# Patient Record
Sex: FEMALE | Race: WHITE | NOT HISPANIC OR LATINO | Employment: OTHER | URBAN - METROPOLITAN AREA
[De-identification: names, ages, dates, MRNs, and addresses within clinical notes are randomized per-mention and may not be internally consistent; named-entity substitution may affect disease eponyms.]

---

## 2017-03-22 ENCOUNTER — HISTORICAL (OUTPATIENT)
Dept: PREADMISSION TESTING | Facility: HOSPITAL | Age: 22
End: 2017-03-22

## 2017-03-23 ENCOUNTER — HISTORICAL (OUTPATIENT)
Dept: SURGERY | Facility: HOSPITAL | Age: 22
End: 2017-03-23

## 2021-07-28 ENCOUNTER — HISTORICAL (OUTPATIENT)
Dept: ADMINISTRATIVE | Facility: HOSPITAL | Age: 26
End: 2021-07-28

## 2021-07-28 LAB
ABS NEUT (OLG): 4.07 X10(3)/MCL (ref 2.1–9.2)
ALBUMIN SERPL-MCNC: 2.8 GM/DL (ref 3.5–5)
ALBUMIN/GLOB SERPL: 0.8 RATIO (ref 1.1–2)
ALP SERPL-CCNC: 88 UNIT/L (ref 40–150)
ALT SERPL-CCNC: 11 UNIT/L (ref 0–55)
AMYLASE SERPL-CCNC: 56 UNIT/L (ref 25–125)
AST SERPL-CCNC: 15 UNIT/L (ref 5–34)
BASOPHILS # BLD AUTO: 0 X10(3)/MCL (ref 0–0.2)
BASOPHILS NFR BLD AUTO: 0 %
BILIRUB SERPL-MCNC: 0.3 MG/DL
BILIRUBIN DIRECT+TOT PNL SERPL-MCNC: 0.1 MG/DL (ref 0–0.5)
BILIRUBIN DIRECT+TOT PNL SERPL-MCNC: 0.2 MG/DL (ref 0–0.8)
BUN SERPL-MCNC: 7.3 MG/DL (ref 7–18.7)
CALCIUM SERPL-MCNC: 8.7 MG/DL (ref 8.4–10.2)
CHLORIDE SERPL-SCNC: 103 MMOL/L (ref 98–107)
CO2 SERPL-SCNC: 27 MMOL/L (ref 22–29)
CREAT SERPL-MCNC: 0.64 MG/DL (ref 0.55–1.02)
CRP SERPL-MCNC: 2.61 MG/DL
EOSINOPHIL # BLD AUTO: 0.1 X10(3)/MCL (ref 0–0.9)
EOSINOPHIL NFR BLD AUTO: 2 %
ERYTHROCYTE [DISTWIDTH] IN BLOOD BY AUTOMATED COUNT: 17.9 % (ref 11.5–17)
FERRITIN SERPL-MCNC: <1.98 NG/ML (ref 4.63–204)
GLOBULIN SER-MCNC: 3.6 GM/DL (ref 2.4–3.5)
GLUCOSE SERPL-MCNC: 82 MG/DL (ref 74–100)
HBV CORE IGM SERPL QL IA: NONREACTIVE
HBV SURFACE AB SER-ACNC: 0.04 MIU/ML
HBV SURFACE AB SERPL IA-ACNC: NONREACTIVE M[IU]/ML
HBV SURFACE AG SERPL QL IA: NONREACTIVE
HCT VFR BLD AUTO: 29.6 % (ref 37–47)
HGB BLD-MCNC: 7.4 GM/DL (ref 12–16)
HIV 1+2 AB+HIV1 P24 AG SERPL QL IA: NONREACTIVE
IRON SATN MFR SERPL: 3 % (ref 20–50)
IRON SERPL-MCNC: 11 UG/DL (ref 50–170)
LIPASE SERPL-CCNC: 35 U/L
LYMPHOCYTES # BLD AUTO: 1.5 X10(3)/MCL (ref 0.6–4.6)
LYMPHOCYTES NFR BLD AUTO: 24 %
MCH RBC QN AUTO: 16.4 PG (ref 27–31)
MCHC RBC AUTO-ENTMCNC: 25 GM/DL (ref 33–36)
MCV RBC AUTO: 65.6 FL (ref 80–94)
MONOCYTES # BLD AUTO: 0.4 X10(3)/MCL (ref 0.1–1.3)
MONOCYTES NFR BLD AUTO: 7 %
NEUTROPHILS # BLD AUTO: 4.07 X10(3)/MCL (ref 2.1–9.2)
NEUTROPHILS NFR BLD AUTO: 66 %
PLATELET # BLD AUTO: 362 X10(3)/MCL (ref 130–400)
PMV BLD AUTO: 9.1 FL (ref 9.4–12.4)
POTASSIUM SERPL-SCNC: 3.8 MMOL/L (ref 3.5–5.1)
PROT SERPL-MCNC: 6.4 GM/DL (ref 6.4–8.3)
RBC # BLD AUTO: 4.51 X10(6)/MCL (ref 4.2–5.4)
SODIUM SERPL-SCNC: 140 MMOL/L (ref 136–145)
TIBC SERPL-MCNC: 321 UG/DL (ref 70–310)
TIBC SERPL-MCNC: 332 UG/DL (ref 250–450)
TRANSFERRIN SERPL-MCNC: 288 MG/DL (ref 180–382)
WBC # SPEC AUTO: 6.2 X10(3)/MCL (ref 4.5–11.5)

## 2021-12-08 ENCOUNTER — HISTORICAL (OUTPATIENT)
Dept: ADMINISTRATIVE | Facility: HOSPITAL | Age: 26
End: 2021-12-08

## 2021-12-08 LAB
ABS NEUT (OLG): 8.46 X10(3)/MCL (ref 2.1–9.2)
ALBUMIN SERPL-MCNC: 3.2 GM/DL (ref 3.5–5)
ALBUMIN/GLOB SERPL: 1 RATIO (ref 1.1–2)
ALP SERPL-CCNC: 163 UNIT/L (ref 40–150)
ALT SERPL-CCNC: 21 UNIT/L (ref 0–55)
AST SERPL-CCNC: 12 UNIT/L (ref 5–34)
BASOPHILS # BLD AUTO: 0 X10(3)/MCL (ref 0–0.2)
BASOPHILS NFR BLD AUTO: 0 %
BILIRUB SERPL-MCNC: 0.2 MG/DL
BILIRUBIN DIRECT+TOT PNL SERPL-MCNC: 0.1 MG/DL (ref 0–0.5)
BILIRUBIN DIRECT+TOT PNL SERPL-MCNC: 0.1 MG/DL (ref 0–0.8)
BUN SERPL-MCNC: 9.3 MG/DL (ref 7–18.7)
CALCIUM SERPL-MCNC: 8.5 MG/DL (ref 8.7–10.5)
CHLORIDE SERPL-SCNC: 106 MMOL/L (ref 98–107)
CO2 SERPL-SCNC: 28 MMOL/L (ref 22–29)
CREAT SERPL-MCNC: 0.63 MG/DL (ref 0.55–1.02)
CRP SERPL-MCNC: 1.83 MG/DL
EOSINOPHIL # BLD AUTO: 0 X10(3)/MCL (ref 0–0.9)
EOSINOPHIL NFR BLD AUTO: 0 %
ERYTHROCYTE [DISTWIDTH] IN BLOOD BY AUTOMATED COUNT: 14.1 % (ref 11.5–17)
GLOBULIN SER-MCNC: 3.1 GM/DL (ref 2.4–3.5)
GLUCOSE SERPL-MCNC: 103 MG/DL (ref 74–100)
HCT VFR BLD AUTO: 36.1 % (ref 37–47)
HGB BLD-MCNC: 11.8 GM/DL (ref 12–16)
LYMPHOCYTES # BLD AUTO: 0.8 X10(3)/MCL (ref 0.6–4.6)
LYMPHOCYTES NFR BLD AUTO: 8 %
MCH RBC QN AUTO: 26.8 PG (ref 27–31)
MCHC RBC AUTO-ENTMCNC: 32.7 GM/DL (ref 33–36)
MCV RBC AUTO: 82 FL (ref 80–94)
MONOCYTES # BLD AUTO: 0.2 X10(3)/MCL (ref 0.1–1.3)
MONOCYTES NFR BLD AUTO: 2 %
NEUTROPHILS # BLD AUTO: 8.46 X10(3)/MCL (ref 2.1–9.2)
NEUTROPHILS NFR BLD AUTO: 88 %
PLATELET # BLD AUTO: 325 X10(3)/MCL (ref 130–400)
PMV BLD AUTO: 8.7 FL (ref 9.4–12.4)
POTASSIUM SERPL-SCNC: 3.9 MMOL/L (ref 3.5–5.1)
PROT SERPL-MCNC: 6.3 GM/DL (ref 6.4–8.3)
RBC # BLD AUTO: 4.4 X10(6)/MCL (ref 4.2–5.4)
SODIUM SERPL-SCNC: 143 MMOL/L (ref 136–145)
WBC # SPEC AUTO: 9.6 X10(3)/MCL (ref 4.5–11.5)

## 2021-12-09 ENCOUNTER — HISTORICAL (OUTPATIENT)
Dept: ADMINISTRATIVE | Facility: HOSPITAL | Age: 26
End: 2021-12-09

## 2021-12-09 LAB — C DIFF INTERP: NEGATIVE

## 2022-02-14 ENCOUNTER — HISTORICAL (OUTPATIENT)
Dept: ADMINISTRATIVE | Facility: HOSPITAL | Age: 27
End: 2022-02-14

## 2022-02-14 LAB
ABS NEUT (OLG): 4.57 (ref 2.1–9.2)
ALBUMIN SERPL-MCNC: 3.3 G/DL (ref 3.5–5)
ALBUMIN/GLOB SERPL: 1.2 {RATIO} (ref 1.1–2)
ALP SERPL-CCNC: 85 U/L (ref 40–150)
ALT SERPL-CCNC: 18 U/L (ref 0–55)
AST SERPL-CCNC: 17 U/L (ref 5–34)
BASOPHILS # BLD AUTO: 0 10*3/UL (ref 0–0.2)
BASOPHILS NFR BLD AUTO: 0 %
BILIRUB SERPL-MCNC: 0.3 MG/DL
BILIRUBIN DIRECT+TOT PNL SERPL-MCNC: 0.1 (ref 0–0.5)
BILIRUBIN DIRECT+TOT PNL SERPL-MCNC: 0.2 (ref 0–0.8)
BUN SERPL-MCNC: 13.9 MG/DL (ref 7–18.7)
CALCIUM SERPL-MCNC: 9 MG/DL (ref 8.7–10.5)
CHLORIDE SERPL-SCNC: 102 MMOL/L (ref 98–107)
CO2 SERPL-SCNC: 28 MMOL/L (ref 22–29)
CREAT SERPL-MCNC: 0.61 MG/DL (ref 0.55–1.02)
CRP SERPL HS-MCNC: 0.89 MG/L
EOSINOPHIL # BLD AUTO: 0.1 10*3/UL (ref 0–0.9)
EOSINOPHIL NFR BLD AUTO: 2 %
ERYTHROCYTE [DISTWIDTH] IN BLOOD BY AUTOMATED COUNT: 12.7 % (ref 11.5–17)
GLOBULIN SER-MCNC: 2.7 G/DL (ref 2.4–3.5)
GLUCOSE SERPL-MCNC: 89 MG/DL (ref 74–100)
HCT VFR BLD AUTO: 37.9 % (ref 37–47)
HEMOLYSIS INTERF INDEX SERPL-ACNC: <0
HGB BLD-MCNC: 11.8 G/DL (ref 12–16)
ICTERIC INTERF INDEX SERPL-ACNC: 0
LIPEMIC INTERF INDEX SERPL-ACNC: 0
LYMPHOCYTES # BLD AUTO: 1.3 10*3/UL (ref 0.6–4.6)
LYMPHOCYTES NFR BLD AUTO: 20 %
MANUAL DIFF? (OHS): NO
MCH RBC QN AUTO: 25.7 PG (ref 27–31)
MCHC RBC AUTO-ENTMCNC: 31.1 G/DL (ref 33–36)
MCV RBC AUTO: 82.6 FL (ref 80–94)
MONOCYTES # BLD AUTO: 0.4 10*3/UL (ref 0.1–1.3)
MONOCYTES NFR BLD AUTO: 6 %
NEUTROPHILS # BLD AUTO: 4.57 10*3/UL (ref 2.1–9.2)
NEUTROPHILS NFR BLD AUTO: 71 %
PLATELET # BLD AUTO: 360 10*3/UL (ref 130–400)
PMV BLD AUTO: 8.4 FL (ref 9.4–12.4)
POTASSIUM SERPL-SCNC: 4 MMOL/L (ref 3.5–5.1)
PROT SERPL-MCNC: 6 G/DL (ref 6.4–8.3)
RBC # BLD AUTO: 4.59 10*6/UL (ref 4.2–5.4)
SODIUM SERPL-SCNC: 137 MMOL/L (ref 136–145)
WBC # SPEC AUTO: 6.5 10*3/UL (ref 4.5–11.5)

## 2022-04-07 ENCOUNTER — HISTORICAL (OUTPATIENT)
Dept: ADMINISTRATIVE | Facility: HOSPITAL | Age: 27
End: 2022-04-07
Payer: COMMERCIAL

## 2022-04-24 VITALS
WEIGHT: 138 LBS | OXYGEN SATURATION: 98 % | SYSTOLIC BLOOD PRESSURE: 112 MMHG | BODY MASS INDEX: 22.18 KG/M2 | HEIGHT: 66 IN | DIASTOLIC BLOOD PRESSURE: 75 MMHG

## 2022-05-16 ENCOUNTER — OFFICE VISIT (OUTPATIENT)
Dept: HEMATOLOGY/ONCOLOGY | Facility: CLINIC | Age: 27
End: 2022-05-16
Payer: COMMERCIAL

## 2022-05-16 VITALS
TEMPERATURE: 98 F | HEIGHT: 66 IN | RESPIRATION RATE: 20 BRPM | HEART RATE: 72 BPM | DIASTOLIC BLOOD PRESSURE: 71 MMHG | BODY MASS INDEX: 25.51 KG/M2 | SYSTOLIC BLOOD PRESSURE: 111 MMHG | WEIGHT: 158.75 LBS | OXYGEN SATURATION: 99 %

## 2022-05-16 DIAGNOSIS — D50.8 OTHER IRON DEFICIENCY ANEMIA: Primary | ICD-10-CM

## 2022-05-16 DIAGNOSIS — D51.0 PERNICIOUS ANEMIA: ICD-10-CM

## 2022-05-16 PROBLEM — D64.9 ABSOLUTE ANEMIA: Status: ACTIVE | Noted: 2022-05-16

## 2022-05-16 PROCEDURE — 1159F MED LIST DOCD IN RCRD: CPT | Mod: CPTII,,, | Performed by: NURSE PRACTITIONER

## 2022-05-16 PROCEDURE — 3008F BODY MASS INDEX DOCD: CPT | Mod: CPTII,,, | Performed by: NURSE PRACTITIONER

## 2022-05-16 PROCEDURE — 3078F PR MOST RECENT DIASTOLIC BLOOD PRESSURE < 80 MM HG: ICD-10-PCS | Mod: CPTII,,, | Performed by: NURSE PRACTITIONER

## 2022-05-16 PROCEDURE — 99214 PR OFFICE/OUTPT VISIT, EST, LEVL IV, 30-39 MIN: ICD-10-PCS | Mod: ,,, | Performed by: NURSE PRACTITIONER

## 2022-05-16 PROCEDURE — 3008F PR BODY MASS INDEX (BMI) DOCUMENTED: ICD-10-PCS | Mod: CPTII,,, | Performed by: NURSE PRACTITIONER

## 2022-05-16 PROCEDURE — 1160F RVW MEDS BY RX/DR IN RCRD: CPT | Mod: CPTII,,, | Performed by: NURSE PRACTITIONER

## 2022-05-16 PROCEDURE — 1159F PR MEDICATION LIST DOCUMENTED IN MEDICAL RECORD: ICD-10-PCS | Mod: CPTII,,, | Performed by: NURSE PRACTITIONER

## 2022-05-16 PROCEDURE — 3074F PR MOST RECENT SYSTOLIC BLOOD PRESSURE < 130 MM HG: ICD-10-PCS | Mod: CPTII,,, | Performed by: NURSE PRACTITIONER

## 2022-05-16 PROCEDURE — 3074F SYST BP LT 130 MM HG: CPT | Mod: CPTII,,, | Performed by: NURSE PRACTITIONER

## 2022-05-16 PROCEDURE — 3078F DIAST BP <80 MM HG: CPT | Mod: CPTII,,, | Performed by: NURSE PRACTITIONER

## 2022-05-16 PROCEDURE — 99214 OFFICE O/P EST MOD 30 MIN: CPT | Mod: ,,, | Performed by: NURSE PRACTITIONER

## 2022-05-16 PROCEDURE — 1160F PR REVIEW ALL MEDS BY PRESCRIBER/CLIN PHARMACIST DOCUMENTED: ICD-10-PCS | Mod: CPTII,,, | Performed by: NURSE PRACTITIONER

## 2022-05-16 RX ORDER — SYRINGE WITH NEEDLE, 1 ML 28GX1/2"
SYRINGE, EMPTY DISPOSABLE MISCELLANEOUS
COMMUNITY
Start: 2022-03-29

## 2022-05-16 RX ORDER — METHYLPREDNISOLONE SOD SUCC 125 MG
125 VIAL (EA) INJECTION ONCE AS NEEDED
Status: CANCELLED | OUTPATIENT
Start: 2022-05-25

## 2022-05-16 RX ORDER — EPINEPHRINE 0.3 MG/.3ML
0.3 INJECTION SUBCUTANEOUS ONCE AS NEEDED
Status: CANCELLED | OUTPATIENT
Start: 2022-05-25

## 2022-05-16 RX ORDER — PANTOPRAZOLE SODIUM 40 MG/1
40 TABLET, DELAYED RELEASE ORAL DAILY
COMMUNITY
Start: 2022-05-10

## 2022-05-16 RX ORDER — SODIUM CHLORIDE 0.9 % (FLUSH) 0.9 %
10 SYRINGE (ML) INJECTION
Status: CANCELLED | OUTPATIENT
Start: 2022-05-25

## 2022-05-16 RX ORDER — DIPHENHYDRAMINE HYDROCHLORIDE 50 MG/ML
50 INJECTION INTRAMUSCULAR; INTRAVENOUS ONCE AS NEEDED
Status: CANCELLED | OUTPATIENT
Start: 2022-05-25

## 2022-05-16 RX ORDER — HEPARIN 100 UNIT/ML
500 SYRINGE INTRAVENOUS
Status: CANCELLED | OUTPATIENT
Start: 2022-05-25

## 2022-05-16 RX ORDER — AZELASTINE 1 MG/ML
SPRAY, METERED NASAL
COMMUNITY
Start: 2022-03-28 | End: 2022-08-29 | Stop reason: SDDI

## 2022-05-16 NOTE — PROGRESS NOTES
[facility] at [facility]    PATIENT: Josselyn Romero  MRN: [id number]  DATE: 5/16/2022    Diagnosis:  Iron deficiency anemia, Chron's disease.     Chief Complaint: Results (No complaints)    Hematology History:   Josselyn is a 27 yo who was diagnosed with Crohn's 5 years ago. Her symptoms have been much worse in the past year. She has been tried on busedonide and imuran, which were not helpful. She is now on Humira, and her dose is being increased from 40 mg to 80 mg. She notes blood in her stool most every bowel movement. She feels tired and achy, and she has a pica for ice. She is anemic with low iron studies.    Subjective:     Interval History:  5/16/2022:  Ms. Romero presents to clinic today for a 2 month follow up visit.   She reports she took her iron tablet for only 4-5 days. Felt queasy when she would take it. She tried taking it with OJ.   States she is feeling good. Don't get SOB or tired like she did last year.   Continues to crave ice, unchanged from before.   Reports LNMP as 4/28/22. Spouse has had a vasectomy.   Receives monthly B-12 injections for history of pernicious anemia. Last injection was on 4/27/2022.     03/28/2022: Patient presents to clinic today for a follow up visit. She no-showed her last appointment on 3/7/2022.  Takes a Flinstone multivitamin w/ iron. Forgets to take MVI quite often. Last dose was this past Friday. Prior to that maybe a week or so.  Energy level good, much better than in the past.  Pica(craving ice chips) has returned. Started about 3 weeks ago.  Denies any SOB w/ exertion. No dizziness or heart palpitations.  LNMP was one month ago, she is due. Denies any chance of pregnancy. Spouse has had a vasectomy.     1/10/22 Josselyn returns for follow up of JUVENAL and B-12 deficiency related to her Crohn's disease. She is awaiting insurance approval to increase frequency of Entyvio. She is feeling very well, with good energy level. No pica. She had COVID in November with head  "cold symptoms. She had slacked off of her oral iron because she sometimes forgot to take it.    11/15/21 [place] returns for JUVENAL and pernicious anemia secondary to Crohn's disease. she is doing well and her activity level has increased. She is now on q 8 week Entyvio infusions. She denies sob, fatigue, weakness, pica, epistaxis, hematochezia, or melena. She continues to take her chewable vitamin with iron BID, however, she is taking it with food.    Past Medical History: Anxiety, Chron's disease, Gallbladder anomaly, GERD, Panic attack, seasonal allergies.     Past Surgical HIstory: Cholecystectomy(laparoscopic) 03/23/2017, resection of gallbladder, percutaneous endoscopic approach(3/23/2017)  Anesthesia for endoscope, LEEP, Colonoscopy, tonsil.     Family History: Scoliosis - mother and brother.     Social History:  reports that she has quit smoking. Her smoking use included vaping with nicotine. She has never used smokeless tobacco.    Allergies:  Review of patient's allergies indicates:   Allergen Reactions    Budesonide      Other reaction(s): Stomach ache    Codeine      Other reaction(s): unknown    Azathioprine Nausea And Vomiting       Review of Systems:  A complete 12 point ROS done with pertinent positives as described in interval history.  Remainder of ROS done in full and are negative.      ECOG Performance Status: 0   Objective:      Vitals:   Vitals:    05/16/22 1042   BP: 111/71   BP Location: Right arm   Patient Position: Sitting   BP Method: Medium (Automatic)   Pulse: 72   Resp: 20   Temp: 98.1 °F (36.7 °C)   TempSrc: Oral   SpO2: 99%   Weight: 72 kg (158 lb 11.7 oz)   Height: 5' 6.24" (1.682 m)     BMI: Body mass index is 25.43 kg/m².     Physical Exam:   General: Neatly groomed. Well-developed well-nourished in no acute distress  Eye: PERRL, EOMI, pale conjunctiva, sclera anicteric.  HENT: Oral cavity and oropharynx moist. No petechiae or paleness.  Neck: supple, no thyromegaly or cervical or " supraclavicular region lymphadenopathy  Respiratory: clear to auscultation bilaterally. No labored breathing. 02 sats 99% on RA  Cardiovascular: regular rate and rhythm without murmurs, gallops or rubs  Gastrointestinal: soft, non-tender, non-distended with normal bowel sounds, without masses to palpation  Musculoskeletal: good ROM range of motion of all extremities.  Integumentary: no rashes or skin lesions present. No bruising to exposed skin.  Neurologic: cranial nerves intact, no signs of peripheral neurological deficit, motor/sensory function intact .    Laboratory results:  Lab Results   Component Value Date    WBC 6.5 02/14/2022    RBC 4.59 02/14/2022    HGB 11.8 02/14/2022    HCT 37.9 02/14/2022    MCV 82.6 02/14/2022    MCH 25.7 02/14/2022    MCHC 31.1 02/14/2022    RDW 12.7 02/14/2022     02/14/2022    MPV 8.4 02/14/2022        CMP  Lab Results   Component Value Date     02/14/2022    K 4.0 02/14/2022    CO2 28 02/14/2022    BUN 13.9 02/14/2022    CREATININE 0.61 02/14/2022    CALCIUM 9.0 02/14/2022    ALBUMIN 3.3 02/14/2022    BILITOT 0.3 02/14/2022    ALKPHOS 85 02/14/2022    AST 17 02/14/2022    ALT 18 02/14/2022    EGFRNONAA >60 02/14/2022 05/16/2022 - WBC 7.19, HGB 10.9, MCV 75.3, iron 17, ferritin 7, sat 4, TIBC 397, unremarkable CMP,    3/24/22 - B12 - 416      Imaging: None.     Assessment:      1. Iron deficiency anemia D50.9  Patient intolerant to oral iron. Iron levels depleted.   Will have her stop oral iron.   Recommend treatment with IV iron.  Patient is in agreement.     --Chron's disease. On Entyvio. States symptoms are much better.    seeing gastroenterologist every 2 months.    2. Pernicious anemia D51.0       Normal B-12 level.       Will continue once monthly B-12 injections w/ next injection due 5/25/22.      3. Hypokalemia. Resolved.         Plan:     Will get PA for IV iron(Feraheme) x 2 doses. Patient has had before and tolerated well.   Stay off oral iron,  intolerant.   Continue with monthly B12 injections. Next due 5/5/2022.     RTC in 10 weeks for reevaluation. CBC, CMP, iron and Ferritin to be done prior.   Call in the interim for any concerns or questions.         Blessing Floyd, NP-C

## 2022-05-24 DIAGNOSIS — D51.0 PERNICIOUS ANEMIA: ICD-10-CM

## 2022-05-24 RX ORDER — CYANOCOBALAMIN 1000 UG/ML
1000 INJECTION, SOLUTION INTRAMUSCULAR; SUBCUTANEOUS
OUTPATIENT
Start: 2022-05-30

## 2022-05-24 RX ORDER — EPINEPHRINE 0.3 MG/.3ML
0.3 INJECTION SUBCUTANEOUS ONCE AS NEEDED
Status: CANCELLED | OUTPATIENT
Start: 2022-09-05

## 2022-05-24 RX ORDER — METHYLPREDNISOLONE SOD SUCC 125 MG
125 VIAL (EA) INJECTION ONCE AS NEEDED
Status: CANCELLED | OUTPATIENT
Start: 2022-09-05

## 2022-05-24 RX ORDER — SODIUM CHLORIDE 0.9 % (FLUSH) 0.9 %
10 SYRINGE (ML) INJECTION
Status: CANCELLED | OUTPATIENT
Start: 2022-09-05

## 2022-05-24 RX ORDER — DIPHENHYDRAMINE HYDROCHLORIDE 50 MG/ML
50 INJECTION INTRAMUSCULAR; INTRAVENOUS ONCE AS NEEDED
Status: CANCELLED | OUTPATIENT
Start: 2022-09-05

## 2022-05-24 RX ORDER — HEPARIN 100 UNIT/ML
500 SYRINGE INTRAVENOUS
Status: CANCELLED | OUTPATIENT
Start: 2022-09-05

## 2022-08-29 ENCOUNTER — OFFICE VISIT (OUTPATIENT)
Dept: HEMATOLOGY/ONCOLOGY | Facility: CLINIC | Age: 27
End: 2022-08-29
Payer: COMMERCIAL

## 2022-08-29 VITALS
TEMPERATURE: 99 F | HEART RATE: 82 BPM | WEIGHT: 163.13 LBS | RESPIRATION RATE: 20 BRPM | DIASTOLIC BLOOD PRESSURE: 69 MMHG | SYSTOLIC BLOOD PRESSURE: 114 MMHG | HEIGHT: 66 IN | OXYGEN SATURATION: 97 % | BODY MASS INDEX: 26.22 KG/M2

## 2022-08-29 DIAGNOSIS — D50.8 OTHER IRON DEFICIENCY ANEMIA: Primary | ICD-10-CM

## 2022-08-29 PROCEDURE — 1159F PR MEDICATION LIST DOCUMENTED IN MEDICAL RECORD: ICD-10-PCS | Mod: CPTII,,, | Performed by: NURSE PRACTITIONER

## 2022-08-29 PROCEDURE — 3078F PR MOST RECENT DIASTOLIC BLOOD PRESSURE < 80 MM HG: ICD-10-PCS | Mod: CPTII,,, | Performed by: NURSE PRACTITIONER

## 2022-08-29 PROCEDURE — 3074F PR MOST RECENT SYSTOLIC BLOOD PRESSURE < 130 MM HG: ICD-10-PCS | Mod: CPTII,,, | Performed by: NURSE PRACTITIONER

## 2022-08-29 PROCEDURE — 3008F PR BODY MASS INDEX (BMI) DOCUMENTED: ICD-10-PCS | Mod: CPTII,,, | Performed by: NURSE PRACTITIONER

## 2022-08-29 PROCEDURE — 99214 PR OFFICE/OUTPT VISIT, EST, LEVL IV, 30-39 MIN: ICD-10-PCS | Mod: ,,, | Performed by: NURSE PRACTITIONER

## 2022-08-29 PROCEDURE — 1159F MED LIST DOCD IN RCRD: CPT | Mod: CPTII,,, | Performed by: NURSE PRACTITIONER

## 2022-08-29 PROCEDURE — 3078F DIAST BP <80 MM HG: CPT | Mod: CPTII,,, | Performed by: NURSE PRACTITIONER

## 2022-08-29 PROCEDURE — 3074F SYST BP LT 130 MM HG: CPT | Mod: CPTII,,, | Performed by: NURSE PRACTITIONER

## 2022-08-29 PROCEDURE — 3008F BODY MASS INDEX DOCD: CPT | Mod: CPTII,,, | Performed by: NURSE PRACTITIONER

## 2022-08-29 PROCEDURE — 99214 OFFICE O/P EST MOD 30 MIN: CPT | Mod: ,,, | Performed by: NURSE PRACTITIONER

## 2022-08-29 RX ORDER — CETIRIZINE HYDROCHLORIDE 10 MG/1
10 TABLET ORAL
COMMUNITY
Start: 2021-08-05

## 2022-09-01 RX ORDER — DIPHENHYDRAMINE HYDROCHLORIDE 50 MG/ML
50 INJECTION INTRAMUSCULAR; INTRAVENOUS ONCE AS NEEDED
Status: CANCELLED | OUTPATIENT
Start: 2022-09-05

## 2022-09-01 RX ORDER — SODIUM CHLORIDE 0.9 % (FLUSH) 0.9 %
10 SYRINGE (ML) INJECTION
Status: CANCELLED | OUTPATIENT
Start: 2022-09-06

## 2022-09-01 RX ORDER — HEPARIN 100 UNIT/ML
500 SYRINGE INTRAVENOUS
Status: CANCELLED | OUTPATIENT
Start: 2022-09-06

## 2022-09-01 RX ORDER — METHYLPREDNISOLONE SOD SUCC 125 MG
125 VIAL (EA) INJECTION ONCE AS NEEDED
Status: CANCELLED | OUTPATIENT
Start: 2022-09-05

## 2022-09-01 RX ORDER — SODIUM CHLORIDE 0.9 % (FLUSH) 0.9 %
10 SYRINGE (ML) INJECTION
Status: CANCELLED | OUTPATIENT
Start: 2022-09-05

## 2022-09-01 RX ORDER — HEPARIN 100 UNIT/ML
500 SYRINGE INTRAVENOUS
Status: CANCELLED | OUTPATIENT
Start: 2022-09-05

## 2022-09-01 RX ORDER — EPINEPHRINE 0.3 MG/.3ML
0.3 INJECTION SUBCUTANEOUS ONCE AS NEEDED
Status: CANCELLED | OUTPATIENT
Start: 2022-09-05

## 2022-09-15 ENCOUNTER — HOSPITAL ENCOUNTER (EMERGENCY)
Facility: HOSPITAL | Age: 27
Discharge: HOME | End: 2022-09-15
Attending: GENERAL PRACTICE
Payer: COMMERCIAL

## 2022-09-15 ENCOUNTER — TELEPHONE (OUTPATIENT)
Dept: GASTROENTEROLOGY | Facility: CLINIC | Age: 27
End: 2022-09-15
Payer: COMMERCIAL

## 2022-09-15 VITALS
OXYGEN SATURATION: 100 % | HEIGHT: 66 IN | TEMPERATURE: 98 F | SYSTOLIC BLOOD PRESSURE: 114 MMHG | DIASTOLIC BLOOD PRESSURE: 75 MMHG | BODY MASS INDEX: 25.71 KG/M2 | RESPIRATION RATE: 16 BRPM | WEIGHT: 160 LBS | HEART RATE: 64 BPM

## 2022-09-15 DIAGNOSIS — K29.70 GASTRITIS, PRESENCE OF BLEEDING UNSPECIFIED, UNSPECIFIED CHRONICITY, UNSPECIFIED GASTRITIS TYPE: Primary | ICD-10-CM

## 2022-09-15 DIAGNOSIS — K50.00 CROHN'S DISEASE INVOLVING TERMINAL ILEUM: ICD-10-CM

## 2022-09-15 LAB
ALBUMIN SERPL-MCNC: 3.7 GM/DL (ref 3.5–5)
ALBUMIN/GLOB SERPL: 1.1 RATIO (ref 1.1–2)
ALP SERPL-CCNC: 63 UNIT/L (ref 40–150)
ALT SERPL-CCNC: 14 UNIT/L (ref 0–55)
AMYLASE SERPL-CCNC: 74 UNIT/L (ref 25–125)
APPEARANCE UR: CLEAR
AST SERPL-CCNC: 13 UNIT/L (ref 5–34)
B-HCG SERPL QL: NEGATIVE
BACTERIA #/AREA URNS AUTO: ABNORMAL /HPF
BASOPHILS # BLD AUTO: 0.02 X10(3)/MCL (ref 0–0.2)
BASOPHILS NFR BLD AUTO: 0.3 %
BILIRUB UR QL STRIP.AUTO: NEGATIVE MG/DL
BILIRUBIN DIRECT+TOT PNL SERPL-MCNC: 0.3 MG/DL
BUN SERPL-MCNC: 11 MG/DL (ref 7–18.7)
CALCIUM SERPL-MCNC: 9.3 MG/DL (ref 8.4–10.2)
CHLORIDE SERPL-SCNC: 108 MMOL/L (ref 98–107)
CO2 SERPL-SCNC: 24 MMOL/L (ref 22–29)
COLOR UR AUTO: YELLOW
CREAT SERPL-MCNC: 0.75 MG/DL (ref 0.55–1.02)
EOSINOPHIL # BLD AUTO: 0.15 X10(3)/MCL (ref 0–0.9)
EOSINOPHIL NFR BLD AUTO: 2.2 %
ERYTHROCYTE [DISTWIDTH] IN BLOOD BY AUTOMATED COUNT: 12.8 % (ref 11.5–17)
GFR SERPLBLD CREATININE-BSD FMLA CKD-EPI: >60 MLS/MIN/1.73/M2
GLOBULIN SER-MCNC: 3.3 GM/DL (ref 2.4–3.5)
GLUCOSE SERPL-MCNC: 105 MG/DL (ref 74–100)
GLUCOSE UR QL STRIP.AUTO: NEGATIVE MG/DL
HCT VFR BLD AUTO: 41.5 % (ref 37–47)
HGB BLD-MCNC: 13.8 GM/DL (ref 12–16)
IMM GRANULOCYTES # BLD AUTO: 0.02 X10(3)/MCL (ref 0–0.04)
IMM GRANULOCYTES NFR BLD AUTO: 0.3 %
KETONES UR QL STRIP.AUTO: NEGATIVE MG/DL
LEUKOCYTE ESTERASE UR QL STRIP.AUTO: ABNORMAL UNIT/L
LIPASE SERPL-CCNC: 50 U/L
LYMPHOCYTES # BLD AUTO: 1.78 X10(3)/MCL (ref 0.6–4.6)
LYMPHOCYTES NFR BLD AUTO: 26.6 %
MCH RBC QN AUTO: 27.9 PG (ref 27–31)
MCHC RBC AUTO-ENTMCNC: 33.3 MG/DL (ref 33–36)
MCV RBC AUTO: 84 FL (ref 80–94)
MONOCYTES # BLD AUTO: 0.46 X10(3)/MCL (ref 0.1–1.3)
MONOCYTES NFR BLD AUTO: 6.9 %
NEUTROPHILS # BLD AUTO: 4.3 X10(3)/MCL (ref 2.1–9.2)
NEUTROPHILS NFR BLD AUTO: 63.7 %
NITRITE UR QL STRIP.AUTO: NEGATIVE
NRBC BLD AUTO-RTO: 0 %
PH UR STRIP.AUTO: 6.5 [PH]
PLATELET # BLD AUTO: 298 X10(3)/MCL (ref 130–400)
PMV BLD AUTO: 8.9 FL (ref 7.4–10.4)
POTASSIUM SERPL-SCNC: 3.5 MMOL/L (ref 3.5–5.1)
PROT SERPL-MCNC: 7 GM/DL (ref 6.4–8.3)
PROT UR QL STRIP.AUTO: NEGATIVE MG/DL
RBC # BLD AUTO: 4.94 X10(6)/MCL (ref 4.2–5.4)
RBC #/AREA URNS AUTO: ABNORMAL /HPF
RBC UR QL AUTO: NEGATIVE UNIT/L
SODIUM SERPL-SCNC: 141 MMOL/L (ref 136–145)
SP GR UR STRIP.AUTO: 1.02
SQUAMOUS #/AREA URNS AUTO: ABNORMAL /HPF
UROBILINOGEN UR STRIP-ACNC: 0.2 MG/DL
WBC # SPEC AUTO: 6.7 X10(3)/MCL (ref 4.5–11.5)
WBC #/AREA URNS AUTO: ABNORMAL /HPF

## 2022-09-15 PROCEDURE — 96361 HYDRATE IV INFUSION ADD-ON: CPT

## 2022-09-15 PROCEDURE — 63600175 PHARM REV CODE 636 W HCPCS: Performed by: GENERAL PRACTICE

## 2022-09-15 PROCEDURE — 81001 URINALYSIS AUTO W/SCOPE: CPT | Performed by: GENERAL PRACTICE

## 2022-09-15 PROCEDURE — 96374 THER/PROPH/DIAG INJ IV PUSH: CPT

## 2022-09-15 PROCEDURE — 36415 COLL VENOUS BLD VENIPUNCTURE: CPT | Performed by: GENERAL PRACTICE

## 2022-09-15 PROCEDURE — 25000003 PHARM REV CODE 250: Performed by: GENERAL PRACTICE

## 2022-09-15 PROCEDURE — 81025 URINE PREGNANCY TEST: CPT | Performed by: GENERAL PRACTICE

## 2022-09-15 PROCEDURE — 99285 EMERGENCY DEPT VISIT HI MDM: CPT | Mod: 25

## 2022-09-15 PROCEDURE — 82150 ASSAY OF AMYLASE: CPT | Performed by: GENERAL PRACTICE

## 2022-09-15 PROCEDURE — 83690 ASSAY OF LIPASE: CPT | Performed by: GENERAL PRACTICE

## 2022-09-15 PROCEDURE — 85025 COMPLETE CBC W/AUTO DIFF WBC: CPT | Performed by: GENERAL PRACTICE

## 2022-09-15 PROCEDURE — 80053 COMPREHEN METABOLIC PANEL: CPT | Performed by: GENERAL PRACTICE

## 2022-09-15 RX ORDER — SUCRALFATE 1 G/10ML
1 SUSPENSION ORAL
Status: DISCONTINUED | OUTPATIENT
Start: 2022-09-15 | End: 2022-09-15

## 2022-09-15 RX ORDER — MORPHINE SULFATE 2 MG/ML
2 INJECTION, SOLUTION INTRAMUSCULAR; INTRAVENOUS
Status: COMPLETED | OUTPATIENT
Start: 2022-09-15 | End: 2022-09-15

## 2022-09-15 RX ORDER — TRAMADOL HYDROCHLORIDE 50 MG/1
50 TABLET ORAL EVERY 6 HOURS PRN
Qty: 12 TABLET | Refills: 0 | Status: ON HOLD | OUTPATIENT
Start: 2022-09-15 | End: 2023-09-01 | Stop reason: HOSPADM

## 2022-09-15 RX ORDER — SUCRALFATE 1 G/1
1 TABLET ORAL 4 TIMES DAILY
Qty: 120 TABLET | Refills: 0 | Status: SHIPPED | OUTPATIENT
Start: 2022-09-15 | End: 2022-10-15

## 2022-09-15 RX ORDER — SUCRALFATE 1 G/1
1 TABLET ORAL ONCE
Status: COMPLETED | OUTPATIENT
Start: 2022-09-15 | End: 2022-09-15

## 2022-09-15 RX ADMIN — MORPHINE SULFATE 2 MG: 2 INJECTION, SOLUTION INTRAMUSCULAR; INTRAVENOUS at 05:09

## 2022-09-15 RX ADMIN — SODIUM CHLORIDE 500 ML: 9 INJECTION, SOLUTION INTRAVENOUS at 05:09

## 2022-09-15 RX ADMIN — SUCRALFATE 1 G: 1 TABLET ORAL at 05:09

## 2022-09-15 NOTE — ED NOTES
Pt to ed with cc of dark tarry stools after iron infusion. Reports abd discomfort near umbilicus. Pt has hx of chron's. Denies vomiting or nausea at this time. Pt had GI cocktail at urgent care which sts that helped her pain.

## 2022-09-15 NOTE — ED PROVIDER NOTES
Encounter Date: 9/15/2022       History     Chief Complaint   Patient presents with    Abdominal Pain     Lower abdominal pain x 3 hours black tarry stools (possibly from iron infusion)     Lower abdominal pain x 3 hours black tarry stools (possibly from iron infusion)    The history is provided by the patient.   Abdominal Pain  The current episode started just prior to arrival. The onset of the illness was abrupt. The abdominal pain is located in the RLQ and LLQ. The abdominal pain does not radiate. The severity of the abdominal pain is 4/10.   The patient states that she believes she is currently not pregnant. The patient has not had a change in bowel habit.   Review of patient's allergies indicates:   Allergen Reactions    Budesonide      Other reaction(s): Stomach ache    Codeine      Other reaction(s): unknown    Azathioprine Nausea And Vomiting     Past Medical History:   Diagnosis Date    Crohn's colitis      Past Surgical History:   Procedure Laterality Date    CHOLECYSTECTOMY      TONSILLECTOMY       Family History   Problem Relation Age of Onset    Scoliosis Mother     Heart disease Mother     Scoliosis Brother     Breast cancer Maternal Grandmother     Diabetes Paternal Grandmother      Social History     Tobacco Use    Smoking status: Former     Types: Vaping with nicotine    Smokeless tobacco: Never   Substance Use Topics    Alcohol use: Not Currently    Drug use: Yes     Types: Marijuana     Comment: medical marijuana     Review of Systems   Constitutional: Negative.    HENT: Negative.     Eyes: Negative.    Respiratory: Negative.     Cardiovascular: Negative.    Gastrointestinal:  Positive for abdominal pain and blood in stool.   Endocrine: Negative.    Genitourinary: Negative.    Musculoskeletal: Negative.    Skin: Negative.    Allergic/Immunologic: Negative.    Neurological: Negative.    Hematological: Negative.    Psychiatric/Behavioral: Negative.     All other systems reviewed and are  negative.    Physical Exam     Initial Vitals [09/15/22 1559]   BP Pulse Resp Temp SpO2   134/88 76 18 98.4 °F (36.9 °C) 99 %      MAP       --         Physical Exam    Nursing note and vitals reviewed.  Constitutional: She appears well-developed and well-nourished.   Cardiovascular:  Normal rate, regular rhythm, normal heart sounds and intact distal pulses.                 ED Course   Procedures  Labs Reviewed   URINALYSIS, REFLEX TO URINE CULTURE - Abnormal; Notable for the following components:       Result Value    Leukocyte Esterase, UA Trace (*)     All other components within normal limits   COMPREHENSIVE METABOLIC PANEL - Abnormal; Notable for the following components:    Chloride 108 (*)     Glucose Level 105 (*)     All other components within normal limits   URINALYSIS, MICROSCOPIC - Abnormal; Notable for the following components:    Bacteria, UA Few (*)     Squamous Epithelial Cells, UA Few (*)     All other components within normal limits   PREGNANCY TEST, URINE RAPID - Normal   LIPASE - Normal   AMYLASE - Normal   CBC W/ AUTO DIFFERENTIAL    Narrative:     The following orders were created for panel order CBC auto differential.  Procedure                               Abnormality         Status                     ---------                               -----------         ------                     CBC with Differential[[id number]]                            Final result                 Please view results for these tests on the individual orders.   CBC WITH DIFFERENTIAL          Imaging Results              CT Abdomen Pelvis  Without Contrast (Final result)  Result time 09/15/22 16:55:52      Final result by Watson Gonzalez MD (09/15/22 16:55:52)                   Impression:      1. Distal ileum concentric mural thickening reflects with nonspecific ileitis which may be inflammatory or infectious.  Please correlate clinically.    2. Small fluid within the endometrium and right ovarian  cysts..      Electronically signed by: Watson Sandhuahim  Date:    09/15/2022  Time:    16:55               Narrative:    EXAMINATION:  CT ABDOMEN PELVIS WITHOUT CONTRAST    CLINICAL HISTORY:  Abdominal pain, acute, nonlocalized;    TECHNIQUE:  Multidetector axial images were obtained from the  diaphragms to below symphysis pubis without the administration of IV contrast. Oral contrast was not administered.    Dose length product of 376 mGycm. Automated exposure control was utilized to minimize radiation dose.    COMPARISON:  None available    FINDINGS:  Included lungs are without suspicious nodularity, acute air space infiltrates or fluid within the pleural spaces.    Within limitations of noncontrast technique, no acute findings of the liver, pancreas and spleen identified. Gallbladder wall is not thickened and there is no intraluminal calcified calculus. No apparent biliary dilation.    The adrenal glands noncontrast evaluation is unremarkable. The kidneys are unremarkable in size and contour. There is no hydronephrosis or nephrolithiasis. The ureters appear normal in course and diameter without intra ureteral stone.    The stomach is mostly decompressed.  There is concentric mural thickening of the distal ileum which involves approximate length of 11 cm seen on image 69 series 2.  This results in luminal narrowing.  No surrounding inflammatory changes.  There is some submucosal fat infiltration about the ileocecal valve.  Appendix is not visualized.  Please correlate clinically.  Colon is nondistended and there is no pericolonic acute strandings.   No free fluid.    Urinary bladder is partially decompressed.  No intravesical stone identified.  There is fluid within the endometrium.  Right ovarian suspected cysts.   There is no pelvic free fluid.                                       Medications   morphine injection 2 mg (has no administration in time range)   sodium chloride 0.9% bolus 500 mL (has no administration  in time range)     Medical Decision Making:   Clinical Tests:   Lab Tests: Ordered and Reviewed  Radiological Study: Ordered and Reviewed  ED Management:  Laboratory workup and CT scan are negative for any acute process. LE on urinalysis, but patient is asymptomatic. She has Crohn's disease currently on entyvio infusions. History of ileitis in the past and he findings on CT are likely chronic. The pain is more supraumbilical.  We will send home with ultram and carafate. The patient will follow up with her gastroenterologist Dr. Murphy. She may benefit from an EGD to assess for PUD vs gastritis.All this was discussed with the patient and she is on board with the plan.  She has been instructed to return for any worsening of condition.                        Clinical Impression:   Final diagnoses:  [K29.70] Gastritis, presence of bleeding unspecified, unspecified chronicity, unspecified gastritis type (Primary)  [K50.00] Crohn's disease involving terminal ileum      ED Disposition Condition    Discharge Stable          ED Prescriptions       Medication Sig Dispense Start Date End Date Auth. Provider    sucralfate (CARAFATE) 1 gram tablet Take 1 tablet (1 g total) by mouth 4 (four) times daily. 120 tablet 9/15/2022 10/15/2022 Suman Downing MD    traMADoL (ULTRAM) 50 mg tablet Take 1 tablet (50 mg total) by mouth every 6 (six) hours as needed for Pain. 12 tablet 9/15/2022 -- Suman Downing MD          Follow-up Information       Follow up With Specialties Details Why Contact Info    Diogenes Salazar MD Family Medicine Call in 3 days As needed, If symptoms worsen [address] [id number] [phone]               Suman Downing MD  09/15/22 4805

## 2022-09-15 NOTE — TELEPHONE ENCOUNTER
----- Message from Weston Melo MA sent at 9/15/2022  4:08 PM CDT -----  Contact: @[phone] @[phone]    ----- Message -----  From: Dimitri Martinez  Sent: 9/15/2022   4:07 PM CDT  To: Dani Garcia Staff    Caller mom ( Angie) is calling in to see if she can get her daughter an appt, she is in the emergency room. Pt suffers from severe Crohns and needs help. Please call to discuss further.

## 2022-11-22 NOTE — PROGRESS NOTES
[facility] at     PATIENT: Josselyn Romero  MRN: [id number]  DATE: 11/23/22    Diagnosis:  Iron deficiency anemia, Chron's disease.     Chief Complaint: None.     TREATMENT HISTORY: IV ferraheme:  9/13/21 and 9/20/21  10/17/21  5/23/22 and 6/2/22 9/8/22 and 9/15/22  B12 injections monthly ; sometimes no shows  Hematology History:   Josselyn is a 28 yo who was diagnosed with Crohn's 5 years ago. Her symptoms have been much worse in the past year. She has been tried on busedonide and imuran, which were not helpful. She is now on Humira, and her dose is being increased from 40 mg to 80 mg. She notes blood in her stool most every bowel movement. She feels tired and achy, and she has a pica for ice. She is anemic with low iron studies.    Subjective:     Interval History:  11/23/22 ferritin 64, iron 62, iron sat 20, HGB 14.4, HCT 43.1, MCV 86.  8/29/2022: ferritin 15. B12 283  Patient presents to clinic today for a 10 week follow-up visit. Received IV iron(Fereheme) on 5/23/2022 and 6/2/2022. States tolerated well. No rashes, dizziness, fainting or nausea.   States she is feeling better with increasing energy, decrease in stomachaches and no ice cravings.  States her Crohn's continues to be well managed On Entyvio.  She is seen gastroenterologist about every 3 months.  Receiving treatment every 4 weeks.  Denies any gastrointestinal bleeding. Last menses was around 8/22/22. States first couple days are her heaviest, but is unchanged from before, and not excessive.   Reports a good appetite. States she has started exercising. Performs video aerobics/walking. Enjoying it.     5/16/2022:  Ms. Romero presents to clinic today for a 2 month follow up visit.   She reports she took her iron tablet for only 4-5 days. Felt queasy when she would take it. She tried taking it with OJ.   States she is feeling good. Don't get SOB or tired like she did last year.   Continues to crave ice, unchanged from before.    Reports LNMP as 4/28/22. Spouse has had a vasectomy.   Receives monthly B-12 injections for history of pernicious anemia. Last injection was on 4/27/2022.     03/28/2022: Patient presents to clinic today for a follow up visit. She no-showed her last appointment on 3/7/2022.  Takes a Flinstone multivitamin w/ iron. Forgets to take MVI quite often. Last dose was this past Friday. Prior to that maybe a week or so.  Energy level good, much better than in the past.  Pica(craving ice chips) has returned. Started about 3 weeks ago.  Denies any SOB w/ exertion. No dizziness or heart palpitations.  LNMP was one month ago, she is due. Denies any chance of pregnancy. Spouse has had a vasectomy.     1/10/22 Josselyn returns for follow up of JUVENAL and B-12 deficiency related to her Crohn's disease. She is awaiting insurance approval to increase frequency of Entyvio. She is feeling very well, with good energy level. No pica. She had COVID in November with head cold symptoms. She had slacked off of her oral iron because she sometimes forgot to take it.    11/15/21 Josselyn returns for JUVENAL and pernicious anemia secondary to Crohn's disease. she is doing well and her activity level has increased. She is now on q 8 week Entyvio infusions. She denies sob, fatigue, weakness, pica, epistaxis, hematochezia, or melena. She continues to take her chewable vitamin with iron BID, however, she is taking it with food.    Past Medical History: Anxiety, Chron's disease, Gallbladder anomaly, GERD, Panic attack, seasonal allergies.     Past Surgical HIstory: Cholecystectomy(laparoscopic) 03/23/2017, resection of gallbladder, percutaneous endoscopic approach(3/23/2017)  Anesthesia for endoscope, LEEP, Colonoscopy, tonsil.     Family History: Scoliosis - mother and brother.     Social History:  reports that she has quit smoking. Her smoking use included vaping with nicotine. She has never used smokeless tobacco. She reports that she does not currently use  "alcohol. She reports current drug use. Drug: Marijuana.    Allergies:  Review of patient's allergies indicates:   Allergen Reactions    Budesonide      Other reaction(s): Stomach ache    Codeine      Other reaction(s): unknown    Azathioprine Nausea And Vomiting     Review of Systems:  A complete 12 point ROS done with pertinent positives as described in interval history.  Remainder of ROS done in full and are negative.    ECOG Performance Status: 0   Objective:   Vitals:   /69   BP Location Right arm   Patient Position Sitting   BP Method Medium (Automatic)   Pulse 82   Resp 20   Temp 98.5 °F (36.9 °C)   Temp src Oral   SpO2 97 %   Weight  74 kg (163 lb 1.6 oz)   Height 5' 6.24" (1.682 m)   Pain Score 0-No pain     BMI: 26.13 kg/m2    Physical Exam:   General: Neatly groomed. Well-developed well-nourished in no acute distress  Eye: PERRL, EOMI, pale conjunctiva, sclera anicteric.  HENT: Oral cavity and oropharynx moist. No petechiae or paleness.  Respiratory: clear to auscultation bilaterally. No labored breathing. 02 sats 99% on RA  Cardiovascular: regular rate and rhythm without murmurs, gallops or rubs  Gastrointestinal: soft, non-tender, non-distended with normal bowel sounds, without masses to palpation  Musculoskeletal: good ROM  upper and lower extremities.  Integumentary: no rashes or skin lesions present. No bruising to exposed skin.  Neurologic: motor/sensory function intact .  Psych: Normal mood and affect.     Laboratory results:  Lab Results   Component Value Date    WBC 6.7 09/15/2022    RBC 4.94 09/15/2022    HGB 13.8 09/15/2022    HCT 41.5 09/15/2022    MCV 84.0 09/15/2022    MCH 27.9 09/15/2022    MCHC 33.3 09/15/2022    RDW 12.8 09/15/2022     09/15/2022    MPV 8.9 09/15/2022        CMP  Lab Results   Component Value Date     09/15/2022    K 3.5 09/15/2022    CO2 24 09/15/2022    BUN 11.0 09/15/2022    CREATININE 0.75 09/15/2022    CALCIUM 9.3 09/15/2022    ALBUMIN 3.7 " 09/15/2022    BILITOT 0.3 09/15/2022    ALKPHOS 63 09/15/2022    AST 13 09/15/2022    ALT 14 09/15/2022    EGFRNONAA >60 02/14/2022 08/29/2022 - WBC 6.41, Hgb 13.5, MCV 86.0, iron 43, ferritin 15, normal CMP. B-12 =283.   05/16/2022 - WBC 7.19, HGB 10.9, MCV 75.3, iron 17, ferritin 7, sat 4, TIBC 397, unremarkable CMP,    3/24/22 - B12 - 416    Assessment:      1. Iron deficiency anemia D50.9  Patient intolerant to oral iron. Iron levels improved.   Still some room for improvement.   Received IV twice in 2021 then iron 2 doses on 5/23/22 and 6/2/2022 and again in 9/2022.  Ferritin currently 67. Will repeat in January..     --Chron's disease. On Entyvio. States symptoms are much better.    seeing gastroenterologist every 3 months.    2. Pernicious anemia D51.0       B-12 level pending today.       Will continue once monthly B-12 injection. B-12 given today, next due today; she has many no shows for B12 injections     RTC in  1/2023 with MD for reevaluation. CBC, CMP, iron and Ferritin to be done prior.   Call in the interim for any concerns or questions.       Mali Singh MD

## 2022-11-23 ENCOUNTER — OFFICE VISIT (OUTPATIENT)
Dept: HEMATOLOGY/ONCOLOGY | Facility: CLINIC | Age: 27
End: 2022-11-23
Payer: COMMERCIAL

## 2022-11-23 VITALS
OXYGEN SATURATION: 98 % | BODY MASS INDEX: 24.45 KG/M2 | HEART RATE: 86 BPM | WEIGHT: 152.13 LBS | SYSTOLIC BLOOD PRESSURE: 115 MMHG | DIASTOLIC BLOOD PRESSURE: 60 MMHG | RESPIRATION RATE: 18 BRPM | TEMPERATURE: 98 F | HEIGHT: 66 IN

## 2022-11-23 DIAGNOSIS — D50.8 OTHER IRON DEFICIENCY ANEMIA: Primary | ICD-10-CM

## 2022-11-23 DIAGNOSIS — D51.0 PERNICIOUS ANEMIA: ICD-10-CM

## 2022-11-23 PROCEDURE — 99213 PR OFFICE/OUTPT VISIT, EST, LEVL III, 20-29 MIN: ICD-10-PCS | Mod: ,,, | Performed by: INTERNAL MEDICINE

## 2022-11-23 PROCEDURE — 3078F PR MOST RECENT DIASTOLIC BLOOD PRESSURE < 80 MM HG: ICD-10-PCS | Mod: CPTII,,, | Performed by: INTERNAL MEDICINE

## 2022-11-23 PROCEDURE — 3008F BODY MASS INDEX DOCD: CPT | Mod: CPTII,,, | Performed by: INTERNAL MEDICINE

## 2022-11-23 PROCEDURE — 1159F PR MEDICATION LIST DOCUMENTED IN MEDICAL RECORD: ICD-10-PCS | Mod: CPTII,,, | Performed by: INTERNAL MEDICINE

## 2022-11-23 PROCEDURE — 3008F PR BODY MASS INDEX (BMI) DOCUMENTED: ICD-10-PCS | Mod: CPTII,,, | Performed by: INTERNAL MEDICINE

## 2022-11-23 PROCEDURE — 3078F DIAST BP <80 MM HG: CPT | Mod: CPTII,,, | Performed by: INTERNAL MEDICINE

## 2022-11-23 PROCEDURE — 3074F PR MOST RECENT SYSTOLIC BLOOD PRESSURE < 130 MM HG: ICD-10-PCS | Mod: CPTII,,, | Performed by: INTERNAL MEDICINE

## 2022-11-23 PROCEDURE — 99213 OFFICE O/P EST LOW 20 MIN: CPT | Mod: ,,, | Performed by: INTERNAL MEDICINE

## 2022-11-23 PROCEDURE — 1159F MED LIST DOCD IN RCRD: CPT | Mod: CPTII,,, | Performed by: INTERNAL MEDICINE

## 2022-11-23 PROCEDURE — 3074F SYST BP LT 130 MM HG: CPT | Mod: CPTII,,, | Performed by: INTERNAL MEDICINE

## 2023-01-15 ENCOUNTER — OFFICE VISIT (OUTPATIENT)
Dept: URGENT CARE | Facility: CLINIC | Age: 28
End: 2023-01-15
Payer: COMMERCIAL

## 2023-01-15 VITALS
HEIGHT: 66 IN | WEIGHT: 150 LBS | OXYGEN SATURATION: 99 % | HEART RATE: 95 BPM | SYSTOLIC BLOOD PRESSURE: 119 MMHG | TEMPERATURE: 98 F | DIASTOLIC BLOOD PRESSURE: 83 MMHG | RESPIRATION RATE: 18 BRPM | BODY MASS INDEX: 24.11 KG/M2

## 2023-01-15 DIAGNOSIS — J02.0 STREP THROAT: Primary | ICD-10-CM

## 2023-01-15 LAB
CTP QC/QA: YES
MOLECULAR STREP A: POSITIVE

## 2023-01-15 PROCEDURE — 96372 THER/PROPH/DIAG INJ SC/IM: CPT | Mod: S$PBB,,, | Performed by: FAMILY MEDICINE

## 2023-01-15 PROCEDURE — 96372 PR INJECTION,THERAP/PROPH/DIAG2ST, IM OR SUBCUT: ICD-10-PCS | Mod: S$PBB,,, | Performed by: FAMILY MEDICINE

## 2023-01-15 PROCEDURE — 3008F PR BODY MASS INDEX (BMI) DOCUMENTED: ICD-10-PCS | Mod: CPTII,,, | Performed by: FAMILY MEDICINE

## 2023-01-15 PROCEDURE — 3074F PR MOST RECENT SYSTOLIC BLOOD PRESSURE < 130 MM HG: ICD-10-PCS | Mod: CPTII,,, | Performed by: FAMILY MEDICINE

## 2023-01-15 PROCEDURE — 3079F PR MOST RECENT DIASTOLIC BLOOD PRESSURE 80-89 MM HG: ICD-10-PCS | Mod: CPTII,,, | Performed by: FAMILY MEDICINE

## 2023-01-15 PROCEDURE — 87651 STREP A DNA AMP PROBE: CPT | Mod: QW,,, | Performed by: FAMILY MEDICINE

## 2023-01-15 PROCEDURE — 87651 POCT STREP A MOLECULAR: ICD-10-PCS | Mod: QW,,, | Performed by: FAMILY MEDICINE

## 2023-01-15 PROCEDURE — 99213 OFFICE O/P EST LOW 20 MIN: CPT | Mod: S$PBB,25,, | Performed by: FAMILY MEDICINE

## 2023-01-15 PROCEDURE — 99213 PR OFFICE/OUTPT VISIT, EST, LEVL III, 20-29 MIN: ICD-10-PCS | Mod: S$PBB,25,, | Performed by: FAMILY MEDICINE

## 2023-01-15 PROCEDURE — 1159F PR MEDICATION LIST DOCUMENTED IN MEDICAL RECORD: ICD-10-PCS | Mod: CPTII,,, | Performed by: FAMILY MEDICINE

## 2023-01-15 PROCEDURE — 3074F SYST BP LT 130 MM HG: CPT | Mod: CPTII,,, | Performed by: FAMILY MEDICINE

## 2023-01-15 PROCEDURE — 1159F MED LIST DOCD IN RCRD: CPT | Mod: CPTII,,, | Performed by: FAMILY MEDICINE

## 2023-01-15 PROCEDURE — 3008F BODY MASS INDEX DOCD: CPT | Mod: CPTII,,, | Performed by: FAMILY MEDICINE

## 2023-01-15 PROCEDURE — 3079F DIAST BP 80-89 MM HG: CPT | Mod: CPTII,,, | Performed by: FAMILY MEDICINE

## 2023-01-15 RX ORDER — AMOXICILLIN 400 MG/5ML
500 POWDER, FOR SUSPENSION ORAL EVERY 12 HOURS
Qty: 126 ML | Refills: 0 | Status: SHIPPED | OUTPATIENT
Start: 2023-01-15 | End: 2023-01-25

## 2023-01-15 RX ORDER — FLUCONAZOLE 150 MG/1
TABLET ORAL
COMMUNITY
Start: 2023-01-03

## 2023-01-15 RX ORDER — BETAMETHASONE SODIUM PHOSPHATE AND BETAMETHASONE ACETATE 3; 3 MG/ML; MG/ML
12 INJECTION, SUSPENSION INTRA-ARTICULAR; INTRALESIONAL; INTRAMUSCULAR; SOFT TISSUE
Status: COMPLETED | OUTPATIENT
Start: 2023-01-15 | End: 2023-01-15

## 2023-01-15 RX ORDER — AMOXICILLIN 500 MG/1
500 TABLET, FILM COATED ORAL EVERY 12 HOURS
Qty: 20 TABLET | Refills: 0 | Status: SHIPPED | OUTPATIENT
Start: 2023-01-15 | End: 2023-01-25

## 2023-01-15 RX ADMIN — BETAMETHASONE SODIUM PHOSPHATE AND BETAMETHASONE ACETATE 12 MG: 3; 3 INJECTION, SUSPENSION INTRA-ARTICULAR; INTRALESIONAL; INTRAMUSCULAR; SOFT TISSUE at 11:01

## 2023-01-15 NOTE — PROGRESS NOTES
Patient ID: [id number]     Chief Complaint: upper respiratory tract infection symptoms    History of Present Illness:     Josselyn Romero is a 27 y.o. female  who presents today for symptoms of Sore Throat (Sore throat, sinus pressure x 1 day . Pt exposed to strep. Pt declines covid and flu testing.)  Patient with a history of Crohn's who takes Entyvio every month presents today with 1 day history of sore throat and congestion.  Her  tested positive for strep earlier this morning.    Pt denies experiencing any fevers, chills, nausea, vomiting, difficulty breathing, dysphagia, or neck stiffness.    Past Medical History:     ----------------------------  Crohn's colitis     Past Surgical History:   Procedure Laterality Date    CHOLECYSTECTOMY      TONSILLECTOMY         Review of patient's allergies indicates:   Allergen Reactions    Budesonide      Other reaction(s): Stomach ache    Codeine      Other reaction(s): unknown    Azathioprine Nausea And Vomiting       Outpatient Medications Marked as Taking for the 1/15/23 encounter (Office Visit) with Partha Sauer MD   Medication Sig Dispense Refill    levocetirizine dihydrochloride (XYZAL ORAL) Take by mouth.      pantoprazole (PROTONIX) 40 MG tablet Take 40 mg by mouth once daily.      vedolizumab (ENTYVIO IV)   0 Refill(s)       Current Facility-Administered Medications for the 1/15/23 encounter (Office Visit) with Partha Sauer MD   Medication Dose Route Frequency Provider Last Rate Last Admin    betamethasone acetate-betamethasone sodium phosphate injection 12 mg  12 mg Intramuscular 1 time in Clinic/HOD Partha Sauer MD           Social History     Socioeconomic History    Marital status:    Tobacco Use    Smoking status: Former     Types: Vaping with nicotine    Smokeless tobacco: Never   Substance and Sexual Activity    Alcohol use: Not Currently    Drug use: Yes     Types: Marijuana     Comment: medical marijuana        Family History   Problem  "Relation Age of Onset    Scoliosis Mother     Heart disease Mother     Scoliosis Brother     Breast cancer Maternal Grandmother     Diabetes Paternal Grandmother         Subjective:     Review of Systems   Constitutional:  Negative for chills, fever and malaise/fatigue.   HENT:  Positive for sinus pain and sore throat. Negative for congestion, ear discharge and ear pain.    Respiratory:  Negative for cough, sputum production, shortness of breath, wheezing and stridor.    Gastrointestinal:  Negative for abdominal pain, diarrhea, nausea and vomiting.   Genitourinary:  Negative for dysuria, frequency and urgency.   Musculoskeletal:  Negative for neck pain.   Skin:  Negative for rash.   Neurological:  Negative for headaches.     Objective:     /83 (BP Location: Left arm)   Pulse 95   Temp 98.4 °F (36.9 °C) (Oral)   Resp 18   Ht 5' 6" (1.676 m)   Wt 68 kg (150 lb)   LMP 01/11/2023   SpO2 99%   BMI 24.21 kg/m²     Physical Exam  Constitutional:       General: She is not in acute distress.     Appearance: Normal appearance. She is not ill-appearing or toxic-appearing.   HENT:      Head: Normocephalic and atraumatic.      Right Ear: Tympanic membrane and ear canal normal.      Left Ear: Tympanic membrane and ear canal normal.      Nose: No congestion or rhinorrhea.      Mouth/Throat:      Pharynx: Oropharynx is clear. No oropharyngeal exudate or posterior oropharyngeal erythema.   Eyes:      General:         Right eye: No discharge.         Left eye: No discharge.      Extraocular Movements: Extraocular movements intact.      Conjunctiva/sclera: Conjunctivae normal.   Cardiovascular:      Rate and Rhythm: Normal rate and regular rhythm.      Heart sounds: Normal heart sounds. No murmur heard.    No gallop.   Pulmonary:      Effort: Pulmonary effort is normal. No respiratory distress.      Breath sounds: Normal breath sounds. No stridor. No wheezing, rhonchi or rales.   Chest:      Chest wall: No tenderness. "   Musculoskeletal:      Cervical back: No rigidity or tenderness.   Lymphadenopathy:      Cervical: No cervical adenopathy.   Neurological:      Mental Status: She is alert and oriented to person, place, and time. Mental status is at baseline.   Psychiatric:         Mood and Affect: Mood normal.         Behavior: Behavior normal.       Assessment & Plan:       ICD-10-CM ICD-9-CM   1. Strep throat  J02.0 034.0        1. Strep throat  -     POCT Strep A, Molecular  -     betamethasone acetate-betamethasone sodium phosphate injection 12 mg  -     amoxicillin (AMOXIL) 500 MG Tab; Take 1 tablet (500 mg total) by mouth every 12 (twelve) hours. for 10 days  Dispense: 20 tablet; Refill: 0  -     amoxicillin (AMOXIL) 400 mg/5 mL suspension; Take 6.3 mLs (504 mg total) by mouth every 12 (twelve) hours. for 10 days  Dispense: 126 mL; Refill: 0      Strep positive.  She is had a difficult time taking oral antibiotics due to her Crohn's, as it greatly upset her stomach.  She notes however that this is only the case with pills, and she has not yet tried antibiotic liquid.  As such, we will send in both the liquid and the pills.  If 1 is to caustic to her stomach, she will try the other.  We discussed the importance of treating strep infection with antibiotics, not necessarily due to the current throat pain, but because of consequences that could happened later on, and she voiced understanding.  She will also take Carafate before trying either of these to to coat the stomach and hopefully prevent irritation.    She also has a allergy to budesonide listed on her record.  She reports that she has had several steroid shots before all without no problem.  However, when she took oral budesonide for her Crohn's, it again greatly upset her stomach.  She is okay with trying a shot today because she believes she is had them in the past and that it will not affect her stomach.

## 2023-01-16 ENCOUNTER — TELEPHONE (OUTPATIENT)
Dept: URGENT CARE | Facility: CLINIC | Age: 28
End: 2023-01-16

## 2023-01-31 ENCOUNTER — OFFICE VISIT (OUTPATIENT)
Dept: HEMATOLOGY/ONCOLOGY | Facility: CLINIC | Age: 28
End: 2023-01-31
Payer: COMMERCIAL

## 2023-01-31 VITALS
WEIGHT: 152.13 LBS | HEART RATE: 73 BPM | RESPIRATION RATE: 20 BRPM | DIASTOLIC BLOOD PRESSURE: 62 MMHG | OXYGEN SATURATION: 99 % | HEIGHT: 66 IN | SYSTOLIC BLOOD PRESSURE: 112 MMHG | TEMPERATURE: 99 F | BODY MASS INDEX: 24.45 KG/M2

## 2023-01-31 DIAGNOSIS — D51.0 PERNICIOUS ANEMIA: ICD-10-CM

## 2023-01-31 DIAGNOSIS — D50.8 OTHER IRON DEFICIENCY ANEMIA: Primary | ICD-10-CM

## 2023-01-31 PROCEDURE — 3074F PR MOST RECENT SYSTOLIC BLOOD PRESSURE < 130 MM HG: ICD-10-PCS | Mod: CPTII,,, | Performed by: NURSE PRACTITIONER

## 2023-01-31 PROCEDURE — 3078F PR MOST RECENT DIASTOLIC BLOOD PRESSURE < 80 MM HG: ICD-10-PCS | Mod: CPTII,,, | Performed by: NURSE PRACTITIONER

## 2023-01-31 PROCEDURE — 99214 PR OFFICE/OUTPT VISIT, EST, LEVL IV, 30-39 MIN: ICD-10-PCS | Mod: ,,, | Performed by: NURSE PRACTITIONER

## 2023-01-31 PROCEDURE — 99214 OFFICE O/P EST MOD 30 MIN: CPT | Mod: ,,, | Performed by: NURSE PRACTITIONER

## 2023-01-31 PROCEDURE — 3078F DIAST BP <80 MM HG: CPT | Mod: CPTII,,, | Performed by: NURSE PRACTITIONER

## 2023-01-31 PROCEDURE — 3074F SYST BP LT 130 MM HG: CPT | Mod: CPTII,,, | Performed by: NURSE PRACTITIONER

## 2023-01-31 PROCEDURE — 1159F PR MEDICATION LIST DOCUMENTED IN MEDICAL RECORD: ICD-10-PCS | Mod: CPTII,,, | Performed by: NURSE PRACTITIONER

## 2023-01-31 PROCEDURE — 1159F MED LIST DOCD IN RCRD: CPT | Mod: CPTII,,, | Performed by: NURSE PRACTITIONER

## 2023-01-31 PROCEDURE — 3008F BODY MASS INDEX DOCD: CPT | Mod: CPTII,,, | Performed by: NURSE PRACTITIONER

## 2023-01-31 PROCEDURE — 3008F PR BODY MASS INDEX (BMI) DOCUMENTED: ICD-10-PCS | Mod: CPTII,,, | Performed by: NURSE PRACTITIONER

## 2023-01-31 NOTE — PROGRESS NOTES
[facility]    PATIENT: Josselyn Romero  MRN: [id number]  DATE: 11/23/22    Diagnosis:  Iron deficiency anemia, Chron's disease.     Chief Complaint: None.     TREATMENT HISTORY: IV ferraheme:  9/13/21 and 9/20/21  10/17/21  5/23/22 and 6/2/22 9/8/22 and 9/15/22  B12 injections monthly ; sometimes no shows  Hematology History:   Josselyn is a 26 yo who was diagnosed with Crohn's 5 years ago. Her symptoms have been much worse in the past year. She has been tried on busedonide and imuran, which were not helpful. She is now on Humira, and her dose is being increased from 40 mg to 80 mg. She notes blood in her stool most every bowel movement. She feels tired and achy, and she has a pica for ice. She is anemic with low iron studies.    Subjective:     Interval History:  1/31/2023: Patient presents to clinic today for a 10 week follow up appointment for anemia.   States she had a  flare in her Chron's disease about 1 1/2 weeks ago. Triggered by an antibiotic she took to treat Strep. Flare lasted 3 days, now resolved. Overall, states her Crohn's continues to be well managed On Entyvio. Denies any gastrointestinal bleeding.She is seen gastroenterologist about every 3 months.   Receiving treatment every 4 weeks.  Had some fatigue yesterday, but overall reports a good energy level. No PICA. No SOB. No dizziness. No heart palpitations.   Menses unchanged from before. States first couple days are her heaviest and not excessive. Next menses due around February 10, 2023. Spouse has had a vasectomy.   Receives monthly B-12 injections for history of pernicious anemia. Did miss dose in October. None since.   Due for injection today.     Past Medical History: Anxiety, Chron's disease, Gallbladder anomaly, GERD, Panic attack, seasonal allergies.     Past Surgical HIstory: Cholecystectomy(laparoscopic) 03/23/2017, resection of gallbladder, percutaneous endoscopic approach(3/23/2017)  Anesthesia for endoscope,  "LEEP, Colonoscopy, tonsil.     Family History: Scoliosis - mother and brother.     Social History:  reports that she has quit smoking. Her smoking use included vaping with nicotine. She has never used smokeless tobacco. She reports that she does not currently use alcohol. She reports current drug use. Drug: Marijuana.    Allergies:  Review of patient's allergies indicates:   Allergen Reactions    Budesonide      Other reaction(s): Stomach ache    Codeine      Other reaction(s): unknown    Azathioprine Nausea And Vomiting     Review of Systems:  A complete 12 point ROS done with pertinent positives as described in interval history.  Remainder of ROS done in full and are negative.    ECOG Performance Status: 0   Objective:   Vitals:   /69   BP Location Right arm   Patient Position Sitting   BP Method Medium (Automatic)   Pulse 82   Resp 20   Temp 98.5 °F (36.9 °C)   Temp src Oral   SpO2 97 %   Weight  74 kg (163 lb 1.6 oz)   Height 5' 6.24" (1.682 m)   Pain Score 0-No pain     BMI: 26.13 kg/m2    Physical Exam:   General: Neatly groomed. Well-developed well-nourished in no acute distress  Eye: PERRL, EOMI, pale conjunctiva, sclera anicteric.  HENT: Oral cavity and oropharynx moist. No petechiae or paleness.  Respiratory: clear to auscultation bilaterally. No labored breathing. 02 sats 99% on RA  Cardiovascular: regular rate and rhythm without murmurs, gallops or rubs  Gastrointestinal: soft, non-tender, non-distended with normal bowel sounds, without masses to palpation  Musculoskeletal: good ROM  upper and lower extremities.  Integumentary: no rashes or skin lesions present. No bruising to exposed skin.  Neurologic: motor/sensory function intact .  Psych: Normal mood and affect.     Laboratory results:  Lab Results   Component Value Date    WBC 6.7 09/15/2022    RBC 4.94 09/15/2022    HGB 13.8 09/15/2022    HCT 41.5 09/15/2022    MCV 84.0 09/15/2022    MCH 27.9 09/15/2022    MCHC 33.3 09/15/2022    RDW 12.8 " 09/15/2022     09/15/2022    MPV 8.9 09/15/2022        CMP  Lab Results   Component Value Date     09/15/2022    K 3.5 09/15/2022    CO2 24 09/15/2022    BUN 11.0 09/15/2022    CREATININE 0.75 09/15/2022    CALCIUM 9.3 09/15/2022    ALBUMIN 3.7 09/15/2022    BILITOT 0.3 09/15/2022    ALKPHOS 63 09/15/2022    AST 13 09/15/2022    ALT 14 09/15/2022    EGFRNONAA >60 02/14/2022 08/29/2022 - WBC 6.41, Hgb 13.5, MCV 86.0, iron 43, ferritin 15, normal CMP. B-12 =283.   05/16/2022 - WBC 7.19, HGB 10.9, MCV 75.3, iron 17, ferritin 7, sat 4, TIBC 397, unremarkable CMP,    3/24/22 - B12 - 416  01/18/2023: Normal CBC, iron 45, Sat % 13 and ferritin 24. B-12 450.     Assessment:      1. Iron deficiency anemia D50.9  Patient intolerant to oral iron.   Received IV iron twice in 2021 then iron 2 doses on 5/23/22 and 6/2/2022 and again in 9/2022.   Iron levels decreased from last visit. Ferritin from 64 to 24.   She is feeling well with some fatigue noted yesterday. Denies chronic fatigue.     --Chron's disease. On Entyvio. States symptoms are well managed with treatment.     seeing gastroenterologist every 3 months.    2. Pernicious anemia D51.0       B-12 improved from 283 to 450.      She has been consistent in getting B-12 last 3-4 months.         Plan:   # Set her up for IV iron Fereheme. Initiate PA with plans on getting treatment next week. 2 doses  by 7 days. One 2/8/2023 and 2nd on 8/15/2023.     # continue to follow with GI for management of Chron's. Have requested most recent note from Dr. Murphy.     # B-12 injection every 4 weeks. Due today.     RTC in  3 months with MD for reevaluation. CBC, CMP, iron and Ferritin to be done prior.   Call in the interim for any concerns or questions.       Blessing Floyd, DORIAN-C

## 2023-07-13 ENCOUNTER — LAB VISIT (OUTPATIENT)
Dept: LAB | Facility: HOSPITAL | Age: 28
End: 2023-07-13
Attending: INTERNAL MEDICINE
Payer: COMMERCIAL

## 2023-07-13 DIAGNOSIS — K50.819 CROHN'S DISEASE OF BOTH SMALL AND LARGE INTESTINE WITH COMPLICATION: Primary | ICD-10-CM

## 2023-07-13 LAB
ALBUMIN SERPL-MCNC: 3.6 G/DL (ref 3.5–5)
ALBUMIN/GLOB SERPL: 1.6 RATIO (ref 1.1–2)
ALP SERPL-CCNC: 62 UNIT/L (ref 40–150)
ALT SERPL-CCNC: 12 UNIT/L (ref 0–55)
AST SERPL-CCNC: 12 UNIT/L (ref 5–34)
BASOPHILS # BLD AUTO: 0.03 X10(3)/MCL
BASOPHILS NFR BLD AUTO: 0.5 %
BILIRUBIN DIRECT+TOT PNL SERPL-MCNC: 0.5 MG/DL
BUN SERPL-MCNC: 8.1 MG/DL (ref 7–18.7)
CALCIUM SERPL-MCNC: 8.6 MG/DL (ref 8.4–10.2)
CHLORIDE SERPL-SCNC: 107 MMOL/L (ref 98–107)
CO2 SERPL-SCNC: 25 MMOL/L (ref 22–29)
CREAT SERPL-MCNC: 0.7 MG/DL (ref 0.55–1.02)
DEPRECATED CALCIDIOL+CALCIFEROL SERPL-MC: 29.4 NG/ML (ref 30–80)
EOSINOPHIL # BLD AUTO: 0.09 X10(3)/MCL (ref 0–0.9)
EOSINOPHIL NFR BLD AUTO: 1.5 %
ERYTHROCYTE [DISTWIDTH] IN BLOOD BY AUTOMATED COUNT: 12.3 % (ref 11.5–17)
GFR SERPLBLD CREATININE-BSD FMLA CKD-EPI: >60 MLS/MIN/1.73/M2
GLOBULIN SER-MCNC: 2.2 GM/DL (ref 2.4–3.5)
GLUCOSE SERPL-MCNC: 105 MG/DL (ref 74–100)
HCT VFR BLD AUTO: 40.6 % (ref 37–47)
HGB BLD-MCNC: 13 G/DL (ref 12–16)
IMM GRANULOCYTES # BLD AUTO: 0.02 X10(3)/MCL (ref 0–0.04)
IMM GRANULOCYTES NFR BLD AUTO: 0.3 %
LYMPHOCYTES # BLD AUTO: 1.5 X10(3)/MCL (ref 0.6–4.6)
LYMPHOCYTES NFR BLD AUTO: 24.4 %
MCH RBC QN AUTO: 27.7 PG (ref 27–31)
MCHC RBC AUTO-ENTMCNC: 32 G/DL (ref 33–36)
MCV RBC AUTO: 86.6 FL (ref 80–94)
MONOCYTES # BLD AUTO: 0.29 X10(3)/MCL (ref 0.1–1.3)
MONOCYTES NFR BLD AUTO: 4.7 %
NEUTROPHILS # BLD AUTO: 4.23 X10(3)/MCL (ref 2.1–9.2)
NEUTROPHILS NFR BLD AUTO: 68.6 %
NRBC BLD AUTO-RTO: 0 %
PLATELET # BLD AUTO: 258 X10(3)/MCL (ref 130–400)
PMV BLD AUTO: 8.8 FL (ref 7.4–10.4)
POTASSIUM SERPL-SCNC: 3.9 MMOL/L (ref 3.5–5.1)
PROT SERPL-MCNC: 5.8 GM/DL (ref 6.4–8.3)
RBC # BLD AUTO: 4.69 X10(6)/MCL (ref 4.2–5.4)
SODIUM SERPL-SCNC: 138 MMOL/L (ref 136–145)
VIT B12 SERPL-MCNC: 444 PG/ML (ref 213–816)
WBC # SPEC AUTO: 6.16 X10(3)/MCL (ref 4.5–11.5)

## 2023-07-13 PROCEDURE — 82306 VITAMIN D 25 HYDROXY: CPT

## 2023-07-13 PROCEDURE — 36415 COLL VENOUS BLD VENIPUNCTURE: CPT

## 2023-07-13 PROCEDURE — 80053 COMPREHEN METABOLIC PANEL: CPT

## 2023-07-13 PROCEDURE — 86480 TB TEST CELL IMMUN MEASURE: CPT

## 2023-07-13 PROCEDURE — 85025 COMPLETE CBC W/AUTO DIFF WBC: CPT

## 2023-07-13 PROCEDURE — 82607 VITAMIN B-12: CPT

## 2023-07-17 LAB
GAMMA INTERFERON BACKGROUND BLD IA-ACNC: 5.73 IU/ML
M TB IFN-G BLD-IMP: POSITIVE
M TB IFN-G CD4+ BCKGRND COR BLD-ACNC: 1.94 IU/ML
M TB IFN-G CD4+CD8+ BCKGRND COR BLD-ACNC: 1.32 IU/ML
MITOGEN IGNF BCKGRD COR BLD-ACNC: 4.27 IU/ML

## 2023-08-29 ENCOUNTER — HOSPITAL ENCOUNTER (INPATIENT)
Facility: HOSPITAL | Age: 28
LOS: 2 days | Discharge: HOME | DRG: 387 | End: 2023-09-01
Attending: EMERGENCY MEDICINE | Admitting: INTERNAL MEDICINE
Payer: COMMERCIAL

## 2023-08-29 DIAGNOSIS — K50.00 TERMINAL ILEITIS: Primary | ICD-10-CM

## 2023-08-29 DIAGNOSIS — K50.012 CROHN'S DISEASE OF ILEUM WITH INTESTINAL OBSTRUCTION: ICD-10-CM

## 2023-08-29 DIAGNOSIS — R07.9 CHEST PAIN: ICD-10-CM

## 2023-08-29 LAB
ALBUMIN SERPL-MCNC: 4.6 G/DL (ref 3.5–5)
ALBUMIN/GLOB SERPL: 1.4 RATIO (ref 1.1–2)
ALP SERPL-CCNC: 72 UNIT/L (ref 40–150)
ALT SERPL-CCNC: 17 UNIT/L (ref 0–55)
APPEARANCE UR: CLEAR
AST SERPL-CCNC: 19 UNIT/L (ref 5–34)
BACTERIA #/AREA URNS AUTO: NORMAL /HPF
BASOPHILS # BLD AUTO: 0.03 X10(3)/MCL
BASOPHILS NFR BLD AUTO: 0.2 %
BILIRUB SERPL-MCNC: 0.8 MG/DL
BILIRUB UR QL STRIP.AUTO: NEGATIVE
BUN SERPL-MCNC: 10.3 MG/DL (ref 7–18.7)
CALCIUM SERPL-MCNC: 10.3 MG/DL (ref 8.4–10.2)
CHLORIDE SERPL-SCNC: 106 MMOL/L (ref 98–107)
CO2 SERPL-SCNC: 21 MMOL/L (ref 22–29)
COLOR UR: ABNORMAL
CREAT SERPL-MCNC: 0.88 MG/DL (ref 0.55–1.02)
EOSINOPHIL # BLD AUTO: 0.01 X10(3)/MCL (ref 0–0.9)
EOSINOPHIL NFR BLD AUTO: 0.1 %
ERYTHROCYTE [DISTWIDTH] IN BLOOD BY AUTOMATED COUNT: 12.7 % (ref 11.5–17)
GFR SERPLBLD CREATININE-BSD FMLA CKD-EPI: >60 MLS/MIN/1.73/M2
GLOBULIN SER-MCNC: 3.3 GM/DL (ref 2.4–3.5)
GLUCOSE SERPL-MCNC: 125 MG/DL (ref 74–100)
GLUCOSE UR QL STRIP.AUTO: NEGATIVE
HCT VFR BLD AUTO: 44.2 % (ref 37–47)
HGB BLD-MCNC: 15.2 G/DL (ref 12–16)
IMM GRANULOCYTES # BLD AUTO: 0.04 X10(3)/MCL (ref 0–0.04)
IMM GRANULOCYTES NFR BLD AUTO: 0.3 %
KETONES UR QL STRIP.AUTO: ABNORMAL
LEUKOCYTE ESTERASE UR QL STRIP.AUTO: ABNORMAL
LYMPHOCYTES # BLD AUTO: 1.11 X10(3)/MCL (ref 0.6–4.6)
LYMPHOCYTES NFR BLD AUTO: 7.5 %
MCH RBC QN AUTO: 27.9 PG (ref 27–31)
MCHC RBC AUTO-ENTMCNC: 34.4 G/DL (ref 33–36)
MCV RBC AUTO: 81.3 FL (ref 80–94)
MONOCYTES # BLD AUTO: 0.79 X10(3)/MCL (ref 0.1–1.3)
MONOCYTES NFR BLD AUTO: 5.3 %
NEUTROPHILS # BLD AUTO: 12.82 X10(3)/MCL (ref 2.1–9.2)
NEUTROPHILS NFR BLD AUTO: 86.6 %
NITRITE UR QL STRIP.AUTO: NEGATIVE
NRBC BLD AUTO-RTO: 0 %
PH UR STRIP.AUTO: 7.5 [PH]
PLATELET # BLD AUTO: 406 X10(3)/MCL (ref 130–400)
PMV BLD AUTO: 8.7 FL (ref 7.4–10.4)
POTASSIUM SERPL-SCNC: 3.4 MMOL/L (ref 3.5–5.1)
PROT SERPL-MCNC: 7.9 GM/DL (ref 6.4–8.3)
PROT UR QL STRIP.AUTO: ABNORMAL
RBC # BLD AUTO: 5.44 X10(6)/MCL (ref 4.2–5.4)
RBC #/AREA URNS AUTO: NORMAL /HPF
RBC UR QL AUTO: NEGATIVE
SODIUM SERPL-SCNC: 141 MMOL/L (ref 136–145)
SP GR UR STRIP.AUTO: 1.02 (ref 1–1.03)
SQUAMOUS #/AREA URNS AUTO: <5 /HPF
UROBILINOGEN UR STRIP-ACNC: 1
WBC # SPEC AUTO: 14.8 X10(3)/MCL (ref 4.5–11.5)
WBC #/AREA URNS AUTO: <5 /HPF

## 2023-08-29 PROCEDURE — 96375 TX/PRO/DX INJ NEW DRUG ADDON: CPT

## 2023-08-29 PROCEDURE — 81001 URINALYSIS AUTO W/SCOPE: CPT | Performed by: EMERGENCY MEDICINE

## 2023-08-29 PROCEDURE — 80053 COMPREHEN METABOLIC PANEL: CPT | Performed by: EMERGENCY MEDICINE

## 2023-08-29 PROCEDURE — 85025 COMPLETE CBC W/AUTO DIFF WBC: CPT | Performed by: EMERGENCY MEDICINE

## 2023-08-29 PROCEDURE — 81025 URINE PREGNANCY TEST: CPT | Performed by: EMERGENCY MEDICINE

## 2023-08-29 PROCEDURE — 86140 C-REACTIVE PROTEIN: CPT | Performed by: NURSE PRACTITIONER

## 2023-08-29 PROCEDURE — 99285 EMERGENCY DEPT VISIT HI MDM: CPT

## 2023-08-30 LAB
B-HCG SERPL QL: NEGATIVE
CRP SERPL-MCNC: 2.6 MG/L

## 2023-08-30 PROCEDURE — 25500020 PHARM REV CODE 255: Performed by: EMERGENCY MEDICINE

## 2023-08-30 PROCEDURE — C9113 INJ PANTOPRAZOLE SODIUM, VIA: HCPCS | Performed by: STUDENT IN AN ORGANIZED HEALTH CARE EDUCATION/TRAINING PROGRAM

## 2023-08-30 PROCEDURE — 63600175 PHARM REV CODE 636 W HCPCS: Performed by: INTERNAL MEDICINE

## 2023-08-30 PROCEDURE — 96374 THER/PROPH/DIAG INJ IV PUSH: CPT

## 2023-08-30 PROCEDURE — 63600175 PHARM REV CODE 636 W HCPCS: Performed by: STUDENT IN AN ORGANIZED HEALTH CARE EDUCATION/TRAINING PROGRAM

## 2023-08-30 PROCEDURE — 25000003 PHARM REV CODE 250: Performed by: NURSE PRACTITIONER

## 2023-08-30 PROCEDURE — 63600175 PHARM REV CODE 636 W HCPCS: Performed by: EMERGENCY MEDICINE

## 2023-08-30 PROCEDURE — 63600175 PHARM REV CODE 636 W HCPCS: Performed by: NURSE PRACTITIONER

## 2023-08-30 PROCEDURE — 96361 HYDRATE IV INFUSION ADD-ON: CPT

## 2023-08-30 PROCEDURE — 25000003 PHARM REV CODE 250: Performed by: EMERGENCY MEDICINE

## 2023-08-30 PROCEDURE — 11000001 HC ACUTE MED/SURG PRIVATE ROOM

## 2023-08-30 RX ORDER — BISACODYL 10 MG
10 SUPPOSITORY, RECTAL RECTAL DAILY
Status: DISCONTINUED | OUTPATIENT
Start: 2023-08-30 | End: 2023-09-01 | Stop reason: HOSPADM

## 2023-08-30 RX ORDER — DICYCLOMINE HYDROCHLORIDE 10 MG/ML
20 INJECTION INTRAMUSCULAR ONCE
Status: COMPLETED | OUTPATIENT
Start: 2023-08-30 | End: 2023-08-30

## 2023-08-30 RX ORDER — ACETAMINOPHEN 650 MG/1
650 SUPPOSITORY RECTAL EVERY 6 HOURS PRN
Status: DISCONTINUED | OUTPATIENT
Start: 2023-08-30 | End: 2023-09-01 | Stop reason: HOSPADM

## 2023-08-30 RX ORDER — PROCHLORPERAZINE EDISYLATE 5 MG/ML
5 INJECTION INTRAMUSCULAR; INTRAVENOUS EVERY 6 HOURS PRN
Status: DISCONTINUED | OUTPATIENT
Start: 2023-08-30 | End: 2023-09-01 | Stop reason: HOSPADM

## 2023-08-30 RX ORDER — MORPHINE SULFATE 4 MG/ML
4 INJECTION, SOLUTION INTRAMUSCULAR; INTRAVENOUS
Status: COMPLETED | OUTPATIENT
Start: 2023-08-30 | End: 2023-08-30

## 2023-08-30 RX ORDER — ACETAMINOPHEN 10 MG/ML
1000 INJECTION, SOLUTION INTRAVENOUS ONCE
Status: COMPLETED | OUTPATIENT
Start: 2023-08-30 | End: 2023-08-30

## 2023-08-30 RX ORDER — ONDANSETRON 2 MG/ML
4 INJECTION INTRAMUSCULAR; INTRAVENOUS EVERY 4 HOURS PRN
Status: DISCONTINUED | OUTPATIENT
Start: 2023-08-30 | End: 2023-09-01 | Stop reason: HOSPADM

## 2023-08-30 RX ORDER — SODIUM CHLORIDE 0.9 % (FLUSH) 0.9 %
10 SYRINGE (ML) INJECTION
Status: DISCONTINUED | OUTPATIENT
Start: 2023-08-30 | End: 2023-09-01 | Stop reason: HOSPADM

## 2023-08-30 RX ORDER — ACETAMINOPHEN 325 MG/1
650 TABLET ORAL EVERY 4 HOURS PRN
Status: DISCONTINUED | OUTPATIENT
Start: 2023-08-30 | End: 2023-09-01 | Stop reason: HOSPADM

## 2023-08-30 RX ORDER — NALOXONE HCL 0.4 MG/ML
0.02 VIAL (ML) INJECTION
Status: DISCONTINUED | OUTPATIENT
Start: 2023-08-30 | End: 2023-09-01 | Stop reason: HOSPADM

## 2023-08-30 RX ORDER — ONDANSETRON 2 MG/ML
4 INJECTION INTRAMUSCULAR; INTRAVENOUS
Status: COMPLETED | OUTPATIENT
Start: 2023-08-30 | End: 2023-08-30

## 2023-08-30 RX ORDER — SODIUM CHLORIDE, SODIUM LACTATE, POTASSIUM CHLORIDE, CALCIUM CHLORIDE 600; 310; 30; 20 MG/100ML; MG/100ML; MG/100ML; MG/100ML
INJECTION, SOLUTION INTRAVENOUS CONTINUOUS
Status: DISCONTINUED | OUTPATIENT
Start: 2023-08-30 | End: 2023-09-01 | Stop reason: HOSPADM

## 2023-08-30 RX ORDER — PANTOPRAZOLE SODIUM 40 MG/10ML
40 INJECTION, POWDER, LYOPHILIZED, FOR SOLUTION INTRAVENOUS
Status: DISCONTINUED | OUTPATIENT
Start: 2023-08-30 | End: 2023-08-30

## 2023-08-30 RX ORDER — KETOROLAC TROMETHAMINE 30 MG/ML
30 INJECTION, SOLUTION INTRAMUSCULAR; INTRAVENOUS EVERY 6 HOURS PRN
Status: DISCONTINUED | OUTPATIENT
Start: 2023-08-30 | End: 2023-09-01 | Stop reason: HOSPADM

## 2023-08-30 RX ORDER — LIDOCAINE HYDROCHLORIDE 20 MG/ML
JELLY TOPICAL
Status: COMPLETED | OUTPATIENT
Start: 2023-08-30 | End: 2023-08-30

## 2023-08-30 RX ORDER — PANTOPRAZOLE SODIUM 40 MG/10ML
40 INJECTION, POWDER, LYOPHILIZED, FOR SOLUTION INTRAVENOUS DAILY
Status: DISCONTINUED | OUTPATIENT
Start: 2023-08-30 | End: 2023-09-01 | Stop reason: HOSPADM

## 2023-08-30 RX ORDER — PANTOPRAZOLE SODIUM 40 MG/1
40 TABLET, DELAYED RELEASE ORAL DAILY
Status: DISCONTINUED | OUTPATIENT
Start: 2023-08-30 | End: 2023-08-30

## 2023-08-30 RX ORDER — KETOROLAC TROMETHAMINE 30 MG/ML
15 INJECTION, SOLUTION INTRAMUSCULAR; INTRAVENOUS EVERY 6 HOURS PRN
Status: DISCONTINUED | OUTPATIENT
Start: 2023-08-30 | End: 2023-09-01 | Stop reason: HOSPADM

## 2023-08-30 RX ORDER — ACETAMINOPHEN 500 MG
1000 TABLET ORAL EVERY 6 HOURS PRN
Status: DISCONTINUED | OUTPATIENT
Start: 2023-08-30 | End: 2023-09-01 | Stop reason: HOSPADM

## 2023-08-30 RX ADMIN — IOPAMIDOL 85 ML: 755 INJECTION, SOLUTION INTRAVENOUS at 01:08

## 2023-08-30 RX ADMIN — PANTOPRAZOLE SODIUM 40 MG: 40 INJECTION, POWDER, FOR SOLUTION INTRAVENOUS at 12:08

## 2023-08-30 RX ADMIN — MORPHINE SULFATE 4 MG: 4 INJECTION INTRAVENOUS at 12:08

## 2023-08-30 RX ADMIN — BISACODYL 10 MG: 10 SUPPOSITORY RECTAL at 04:08

## 2023-08-30 RX ADMIN — DICYCLOMINE HYDROCHLORIDE 20 MG: 20 INJECTION, SOLUTION INTRAMUSCULAR at 09:08

## 2023-08-30 RX ADMIN — ONDANSETRON 4 MG: 2 INJECTION INTRAMUSCULAR; INTRAVENOUS at 12:08

## 2023-08-30 RX ADMIN — LIDOCAINE HYDROCHLORIDE: 20 JELLY TOPICAL at 02:08

## 2023-08-30 RX ADMIN — ACETAMINOPHEN 650 MG: 650 SUPPOSITORY RECTAL at 09:08

## 2023-08-30 RX ADMIN — BENZOCAINE: 220 SPRAY, METERED PERIODONTAL at 03:08

## 2023-08-30 RX ADMIN — SODIUM CHLORIDE, POTASSIUM CHLORIDE, SODIUM LACTATE AND CALCIUM CHLORIDE 1000 ML: 600; 310; 30; 20 INJECTION, SOLUTION INTRAVENOUS at 12:08

## 2023-08-30 RX ADMIN — ACETAMINOPHEN 1000 MG: 10 INJECTION, SOLUTION INTRAVENOUS at 09:08

## 2023-08-30 RX ADMIN — SODIUM CHLORIDE, POTASSIUM CHLORIDE, SODIUM LACTATE AND CALCIUM CHLORIDE: 600; 310; 30; 20 INJECTION, SOLUTION INTRAVENOUS at 09:08

## 2023-08-30 NOTE — CONSULTS
Gastroenterology Consultation Note    Reason for Consult:  Diagnoses of Terminal ileitis and Chest pain were pertinent to this visit.    PCP:   Susi Christianson DO    Referring MD:  Self, Aaarefdenisa  No address on file    Hospital Day: 0     Initial History of Present Illness (HPI):  This is a 28 y.o. female patient of Dr. Kai Murphy presented to ED with abdominal pain, nausea and vomiting x1 day.   Abdominal CT with contrast revealed There is long segment circumferential wall thickening of the terminal and distal ileum (series 2, image 106). There is long segment moderately distended small bowel loops in the left mid and lower abdomen proximal to the thickened segment. These findings suggest an acute exacerbation of Crohns disease with distal small bowel obstruction.      She has been evaluated by the surgical team for SBO with no plans for acute surgical intervention at this time.       Diagnosed with Crohns disease in 2016, with TI and duodenal involvement at the time and now suggestion of pancolitis, with TI stricture formation, no prior biologics as of 4/2021, with just steroid use in the past and documented non-compliance.  TI stricture noted during 2020 colonoscopy.  Labs notable as of 7/2021 visit for significant iron deficiency anemia, vitamin D deficiency and an albumin below 3.  She had stool studies done 6/2021 with negative stool culture, negative C.diff testing and elevated calprotectin.  CT scan done 2/2021 with Pan-colitis, TI inflammation with stenosis noted and no abscess or fistulas seen.    No previous abdominal surgeries.  She has been seen by Dr. Wilson with colorectal surgery for anal fissures    Patient reports improvement in abdominal pain with gastric decompression. She has been passing some gas. Bowel sounds are still hypoactive.   Baseline bowel habits is 1-2 semi-formed stools daily without significant abdominal pains. She does report frequent food intolerances with loose, urgent  "stooling occasionally. Most recently she has had up to 1 day without a bowel movement and change in stool texture leading up to this SBO episode.     Her main complaint is throat pain with the NGT.     She is an ex smoker and avoids Nsaids at home.     ROS:  Review of Systems   Constitutional:  Negative for chills, fever and weight loss.   HENT:  Negative for congestion and hearing loss.    Eyes:  Negative for blurred vision.   Respiratory:  Negative for cough, hemoptysis, sputum production, shortness of breath and wheezing.    Cardiovascular:  Negative for chest pain and palpitations.   Gastrointestinal:  Positive for abdominal pain (improved post gastric decompression), nausea (improved) and vomiting (improved). Negative for heartburn.   Genitourinary:  Negative for dysuria.   Skin:  Negative for rash.   Neurological:  Negative for dizziness, weakness and headaches.   Psychiatric/Behavioral: Negative.         Medical History:   Past Medical History:   Diagnosis Date    Crohn's colitis        Surgical History:   Past Surgical History:   Procedure Laterality Date    CHOLECYSTECTOMY      TONSILLECTOMY         Family History:   Family History   Problem Relation Age of Onset    Scoliosis Mother     Heart disease Mother     Scoliosis Brother     Breast cancer Maternal Grandmother     Diabetes Paternal Grandmother    .     Social History:   Social History     Tobacco Use    Smoking status: Former     Types: Vaping with nicotine    Smokeless tobacco: Never   Substance Use Topics    Alcohol use: Not Currently       Allergies:  Review of patient's allergies indicates:   Allergen Reactions    Budesonide      Other reaction(s): Stomach ache    Codeine      Other reaction(s): unknown    Azathioprine Nausea And Vomiting       (Not in a hospital admission)        Objective Findings:    Vital Signs:  BP (!) 101/54   Pulse (!) 54   Temp 98.1 °F (36.7 °C)   Resp 20   Ht 5' 6" (1.676 m)   Wt 62.9 kg (138 lb 10.7 oz)   SpO2 97% "   Breastfeeding No   BMI 22.38 kg/m²   Body mass index is 22.38 kg/m².    Physical Exam:  Physical Exam  Constitutional:       General: She is not in acute distress.     Appearance: Normal appearance. She is not ill-appearing.   HENT:      Head: Normocephalic.      Nose: No congestion.      Mouth/Throat:      Mouth: Mucous membranes are dry.      Pharynx: Oropharynx is clear. No posterior oropharyngeal erythema.   Eyes:      Pupils: Pupils are equal, round, and reactive to light.   Cardiovascular:      Rate and Rhythm: Normal rate and regular rhythm.      Pulses: Normal pulses.      Heart sounds: Normal heart sounds.   Pulmonary:      Effort: Pulmonary effort is normal.      Breath sounds: Normal breath sounds.   Abdominal:      General: Bowel sounds are decreased. There is no distension.      Palpations: Abdomen is soft.      Tenderness: There is no abdominal tenderness. There is no guarding.   Musculoskeletal:         General: No swelling or tenderness. Normal range of motion.   Skin:     General: Skin is warm and dry.      Coloration: Skin is not jaundiced.   Neurological:      Mental Status: She is alert and oriented to person, place, and time.      Motor: No weakness.      Gait: Gait normal.   Psychiatric:         Mood and Affect: Mood normal.         Behavior: Behavior normal.         Labs:  Recent Results (from the past 48 hour(s))   Comprehensive metabolic panel    Collection Time: 08/29/23  9:42 PM   Result Value Ref Range    Sodium Level 141 136 - 145 mmol/L    Potassium Level 3.4 (L) 3.5 - 5.1 mmol/L    Chloride 106 98 - 107 mmol/L    Carbon Dioxide 21 (L) 22 - 29 mmol/L    Glucose Level 125 (H) 74 - 100 mg/dL    Blood Urea Nitrogen 10.3 7.0 - 18.7 mg/dL    Creatinine 0.88 0.55 - 1.02 mg/dL    Calcium Level Total 10.3 (H) 8.4 - 10.2 mg/dL    Protein Total 7.9 6.4 - 8.3 gm/dL    Albumin Level 4.6 3.5 - 5.0 g/dL    Globulin 3.3 2.4 - 3.5 gm/dL    Albumin/Globulin Ratio 1.4 1.1 - 2.0 ratio    Bilirubin  Total 0.8 <=1.5 mg/dL    Alkaline Phosphatase 72 40 - 150 unit/L    Alanine Aminotransferase 17 0 - 55 unit/L    Aspartate Aminotransferase 19 5 - 34 unit/L    eGFR >60 mls/min/1.73/m2   CBC with Differential    Collection Time: 08/29/23  9:42 PM   Result Value Ref Range    WBC 14.80 (H) 4.50 - 11.50 x10(3)/mcL    RBC 5.44 (H) 4.20 - 5.40 x10(6)/mcL    Hgb 15.2 12.0 - 16.0 g/dL    Hct 44.2 37.0 - 47.0 %    MCV 81.3 80.0 - 94.0 fL    MCH 27.9 27.0 - 31.0 pg    MCHC 34.4 33.0 - 36.0 g/dL    RDW 12.7 11.5 - 17.0 %    Platelet 406 (H) 130 - 400 x10(3)/mcL    MPV 8.7 7.4 - 10.4 fL    Neut % 86.6 %    Lymph % 7.5 %    Mono % 5.3 %    Eos % 0.1 %    Basophil % 0.2 %    Lymph # 1.11 0.6 - 4.6 x10(3)/mcL    Neut # 12.82 (H) 2.1 - 9.2 x10(3)/mcL    Mono # 0.79 0.1 - 1.3 x10(3)/mcL    Eos # 0.01 0 - 0.9 x10(3)/mcL    Baso # 0.03 <=0.2 x10(3)/mcL    IG# 0.04 0 - 0.04 x10(3)/mcL    IG% 0.3 %    NRBC% 0.0 %   Urinalysis, Reflex to Urine Culture    Collection Time: 08/29/23  9:45 PM    Specimen: Urine   Result Value Ref Range    Color, UA Dark Yellow Yellow, Light-Yellow, Dark Yellow, Monik, Straw    Appearance, UA Clear Clear    Specific Gravity, UA 1.024 1.005 - 1.030    pH, UA 7.5 5.0 - 8.5    Protein, UA Trace (A) Negative    Glucose, UA Negative Negative, Normal    Ketones, UA 4+ (A) Negative    Blood, UA Negative Negative    Bilirubin, UA Negative Negative    Urobilinogen, UA 1.0 0.2, 1.0, Normal    Nitrites, UA Negative Negative    Leukocyte Esterase, UA 1+ (A) Negative   Urinalysis, Microscopic    Collection Time: 08/29/23  9:45 PM   Result Value Ref Range    RBC, UA 0-5 None Seen, 0-2, 3-5, 0-5 /HPF    WBC, UA <5 <=5 /HPF    Squamous Epithelial Cells, UA <5 <=5 /HPF    Bacteria, UA None Seen None Seen, Rare, Occasional /HPF   Pregnancy, urine rapid    Collection Time: 08/29/23  9:45 PM   Result Value Ref Range    Beta hCG Qualitative, Urine Negative Negative       XR Gastric tube check, non-radiologist performed   Final  Result      Satisfactory enteric tube positioning         Electronically signed by: Doni Alcocer MD   Date:    08/30/2023   Time:    07:36      CT Abdomen Pelvis With Contrast   Final Result   Impression:      1. There is long segment circumferential wall thickening of the terminal and distal ileum (series 2, image 106). There is long segment moderately distended small bowel loops in the left mid and lower abdomen proximal to the thickened segment. These findings suggest an acute exacerbation of Crohns disease with distal small bowel obstruction.      2. Details and other findings as discussed above.      No significant discrepancy with overnight report.         Electronically signed by: Watson Gonzalez   Date:    08/30/2023   Time:    07:57          Imaging:  Imaging Results              XR Gastric tube check, non-radiologist performed (Final result)  Result time 08/30/23 07:36:09   Procedure changed from X-Ray Abdomen AP 1 View     Final result by Doni Alcocer MD (08/30/23 07:36:09)                   Impression:      Satisfactory enteric tube positioning      Electronically signed by: Doni Alcocer MD  Date:    08/30/2023  Time:    07:36               Narrative:    EXAMINATION:  One-view upper abdomen    CLINICAL HISTORY:  Gastric tube check    COMPARISON:  CT abdomen and pelvis 08/30/2023    FINDINGS:  Enteric tube projects past the GE junction with its tip in side port over the left upper quadrant in the expected region of the stomach.                                       CT Abdomen Pelvis With Contrast (Final result)  Result time 08/30/23 07:57:03      Final result by Watson Gonzalez MD (08/30/23 07:57:03)                   Impression:    Impression:    1. There is long segment circumferential wall thickening of the terminal and distal ileum (series 2, image 106). There is long segment moderately distended small bowel loops in the left mid and lower abdomen proximal to the thickened segment. These findings  suggest an acute exacerbation of Crohns disease with distal small bowel obstruction.    2. Details and other findings as discussed above.    No significant discrepancy with overnight report.      Electronically signed by: Watson Gonzalez  Date:    08/30/2023  Time:    07:57               Narrative:      TECHNIQUE:CT OF THE ABDOMEN AND PELVIS WAS PERFORMED WITH AXIAL IMAGES AS WELL AS SAGITTAL AND CORONAL RECONSTRUCTION IMAGES WITH INTRAVENOUS CONTRAST.    COMPARISON:COMPARISON IS WITH STUDY DATED 2022-09-15 16:52:14.    CLINICAL HISTORY:PT REPORTS SEVERE ABDOMINAL PAIN , VOMITING STARTING TODAY, PT REPORTS APPROX 5-6 EPISODES, PT REPORTS HX OF CHROHNS, LAST BM TODAY, NOT NORMAL BM PER PT. PT IS EXTREMELY ANXIOUS , TACHYPNEIC, DIAPHORETIC. GI SPECIALIST TOLD HER TO COME TO ED AND BE CHECKED FOR OBSTRUCTION. ).    DOSAGE INFORMATION:AUTOMATED EXPOSURE CONTROL WAS UTILIZED.      Findings:    Thorax:    Lungs:The visualized lung bases appear unremarkable.    Pleura:No effusions or thickening are seen.    Heart:The heart size is within normal limits.    Abdomen:    Abdominal Wall:No abdominal wall pathology is seen.    Liver:The liver appears unremarkable.    Biliary System:No intrahepatic or extrahepatic biliary duct dilatation is seen.    Gallbladder:Surgical clips are seen in the gallbladder fossa consistent with prior cholecystectomy.    Pancreas:The pancreas appears unremarkable.    Spleen:The spleen appears unremarkable.    Adrenals:The adrenal glands appear unremarkable.    Kidneys:The kidneys appear unremarkable with no stones cysts masses or hydronephrosis.    Aorta:The abdominal aorta appears unremarkable.    Bowel:    Esophagus:The visualized esophagus appears unremarkable.    Stomach:The stomach appears unremarkable.    Duodenum:Unremarkable appearing duodenum.    Small Bowel:There is long segment circumferential wall thickening of the terminal and distal ileum (series 2, image 106). The prior study also  demonstrates distal ileal wall thickening. There is long segment moderately distended small bowel loops in the left mid and lower abdomen proximal to the thickened segment. These findings suggest an acute exacerbation of Crohns disease with distal small bowel obstruction. The remainder of the small bowel loops appear unremarkable.    Colon:Nondistended.    Appendix:No appendix is identified.    Peritoneum:No free intraperitoneal air is seen. Minimal free fluid is seen in the pelvis.    Pelvis:    Bladder:The bladder appears unremarkable.    Female:    Uterus:The uterus appears unremarkable.    Ovaries:No adnexal masses are seen.    Bony structures:    Dorsal Spine:The visualized dorsal spine appears unremarkable.    Bony Pelvis:The visualized bony structures of the pelvis appear unremarkable.    Notifications:The results were discussed with the emergency room physician Dr. San prior to dictation at 2023-08-30 01:57:58 CDT.                        Preliminary result by Watson Gonzalez MD (08/30/23 01:23:29)                   Narrative:    START OF REPORT:  TECHNIQUE: CT OF THE ABDOMEN AND PELVIS WAS PERFORMED WITH AXIAL IMAGES AS WELL AS SAGITTAL AND CORONAL RECONSTRUCTION IMAGES WITH INTRAVENOUS CONTRAST.    COMPARISON: COMPARISON IS WITH STUDY DATED 2022-09-15 16:52:14.    CLINICAL HISTORY: PT REPORTS SEVERE ABDOMINAL PAIN , VOMITING STARTING TODAY, PT REPORTS APPROX 5-6 EPISODES, PT REPORTS HX OF CHROHNS, LAST BM TODAY, NOT NORMAL BM PER PT. PT IS EXTREMELY ANXIOUS , TACHYPNEIC, DIAPHORETIC. GI SPECIALIST TOLD HER TO COME TO ED AND BE CHECKED FOR OBSTRUCTION. ).    DOSAGE INFORMATION: AUTOMATED EXPOSURE CONTROL WAS UTILIZED.    Findings:  Thorax:  Lungs: The visualized lung bases appear unremarkable.  Pleura: No effusions or thickening are seen.  Heart: The heart size is within normal limits.  Abdomen:  Abdominal Wall: No abdominal wall pathology is seen.  Liver: The liver appears unremarkable.  Biliary  System: No intrahepatic or extrahepatic biliary duct dilatation is seen.  Gallbladder: Surgical clips are seen in the gallbladder fossa consistent with prior cholecystectomy.  Pancreas: The pancreas appears unremarkable.  Spleen: The spleen appears unremarkable.  Adrenals: The adrenal glands appear unremarkable.  Kidneys: The kidneys appear unremarkable with no stones cysts masses or hydronephrosis.  Aorta: The abdominal aorta appears unremarkable.  IVC: Unremarkable.  Bowel:  Esophagus: The visualized esophagus appears unremarkable.  Stomach: The stomach appears unremarkable.  Duodenum: Unremarkable appearing duodenum.  Small Bowel: There is long segment circumferential wall thickening of the terminal and distal ileum (series 2, image 106). The prior study also demonstrates distal ileal wall thickening. There is long segment moderately distended small bowel loops in the left mid and lower abdomen proximal to the thickened segment. These findings suggest an acute exacerbation of Crohns disease with distal small bowel obstruction. The remainder of the small bowel loops appear unremarkable.  Colon: Nondistended.  Appendix: No appendix is identified.  Peritoneum: No free intraperitoneal air is seen. Minimal free fluid is seen in the pelvis.    Pelvis:  Bladder: The bladder appears unremarkable.  Female:  Uterus: The uterus appears unremarkable.  Ovaries: No adnexal masses are seen.    Bony structures:  Dorsal Spine: The visualized dorsal spine appears unremarkable.  Bony Pelvis: The visualized bony structures of the pelvis appear unremarkable.    Notifications: The results were discussed with the emergency room physician Dr. San prior to dictation at 2023-08-30 01:57:58 CDT.      Impression:  1. There is long segment circumferential wall thickening of the terminal and distal ileum (series 2, image 106). There is long segment moderately distended small bowel loops in the left mid and lower abdomen proximal to the  thickened segment. These findings suggest an acute exacerbation of Crohns disease with distal small bowel obstruction.  2. Details and other findings as discussed above.                                       Endoscopy History:    11/16/2022  Colonoscopy, A stricture of benign intrinsic appearance was noted in the ileocecal valve, worsened from prior, likely related to healing disease, appears fibrotic. Scarring was noted in the terminal ileum. Mild anal stricture was noted in the anal canal. The stricture was easily traversed with the pediatric colonoscope. Two large perianal skin tags were noted. Scarring was noted in the ileocecal valve. Inflammation has improved at the ileocecal valve. Perianal scarring was noted in the anus. The colonoscope was able to engage the ileocecal valve but not able to enter the ileocecal valve. Previous inflammation TI seems to have improved from limited view.     Last  EGD/Colonoscopy 8/12/2021 Dr. Kai Murphy,  A stricture was noted in the ileocecal valve. The scope traversed the lesion. Mild erythema was noted in the whole colon. These findings are compatible with Crohn's Disease. The gastroesophageal junction was seen at 38 cm. Two superficial ulcers were found in the second part of the duodenum. Multiple cold forceps biopsies were performed for histology in the second part of the duodenum. Mild inflammation and scarring of the mucosa with no bleeding was noted in the duodenal bulb and 2nd portion of the duodenum, appearance of healing ulcers consistent with Crohn's of the duodenum history. Multiple cold forceps biopsies were performed for histology in the duodenal bulbs. A posterior anal fissure was noted. Severe ulceration and scarring were noted in the terminal ileum. These findings are compatible with Crohn's Disease. We advanced the scope 5 cm into the terminal ileum. Perianal skin tags and scarring was noted. Scarring of the mucosa with no bleeding was noted in the second  part of the duodenum. terminal ileum luminal stricture. Not able to advance more than 5-10 cm past the ileocecal valve. Diffuse mild erythema of the mucosa with no bleeding was noted in the whole stomach. These findings are compatible with gastritis. Ulceration with spontaneous bleeding was noted in the ileocecal valve. Pathology Report - Stomach - gastritis, H.pylori negative, Duodenum - erosive duodenitis. TI - Severely active ileitis with ulcer, consistent with Crohn's. Right and left colon - normal.     colonoscopy 5/22/2020, Dr. Manuel Philip - Small internal hemorrhoids were noted. Slightly stenotic TI not able to reach completely. R and L colon were normal. Ulceration around ileocecal valve. Path Report, Colon Dr Manuel Philip - Terminal ileum bx: chronic active ileitis, ileocecal valve bx: chronic active ileitis with ulceration. Ascending colon bx: unremarkable, Descending colon bx: no dysplasia, unremarkable.     Colonoscopy 9/7/2016 Dr. Manuel Philip - Small internal hemorrhoids were noted. Hypertrophied anal papillae noted. Few small shallow ulcer and erythema in the terminal ileum. Pathology, Terminal ileum bx: chronic active ileitis, Colon random bx: unremarkable colonic mucosa.    EGD 8/9/2016 Dr. Manuel Philip - Erythema in the antrum compatible with gastritis. Multiple punched areas that were oozing blood in second portion of duodenum. Suspicious for small bowel Crohn's. Esophagus normal. Pathology, Duodenum, second part ulcer bx: active erosive peptic duodenitis. Duodenal bulb ulcer, active erosive peptic duodenitis, Stomach antrum bx: chronic gastritis with focal activity.     Assessment/Plan:  Crohn's   with fibrotic stricture in IC valve on last endoscopy 11/2022. Likely the culprit. CRP wnl  Entyvio every 4 weeks with next infusion scheduled for 9/11/23  SBO- doing well with gastric decompression. Will add ducolax suppository starting this afternoon/evening. Tylenol suppository for  pains instead of opioids or nsaids.   Once patient able to resume solid oral intake, Low residue diet only!    No smoking, No NSAIDS      Thank you for allowing us to participate in the care of Josselyn Romero.    Tess Shi NP acting as scribe for ZACKERY Philip MD

## 2023-08-30 NOTE — CONSULTS
Acute Care Surgery   Consult Note    Patient Name: Josselyn Romero  YOB: 1995  Date: 08/30/2023 3:48 AM  Date of Admission: 8/29/2023  HD#0  POD#* No surgery found *    PRESENTING HISTORY   Chief Complaint/Reason for Admission:  SBO    History of Present Illness:  Patient is a 28-year-old female with a history of Crohn's disease who presented to the ED with abdominal pain, nausea, and vomiting for 1 day.  The patient states that her Crohn's disease has been pretty well controlled over the past few years on Entyvio infusions.  However, the patient reports that she has eaten foods that she does not typically eat in the past few days and has eaten more than normal.  She experienced a sudden onset of abdominal pain and bloating earlier in the day and has not passed gas or had a bowel movement since that time.  Patient has never experienced obstruction before secondary to Crohn's disease.  She has never had abdominal surgery.  She has been seen by Dr. Wilson with colorectal surgery for anal fissures, and by Dr. Murphy with GI for management of Crohn's.    On arrival, patient was afebrile with stable vital signs.  WBC is elevated 14.8.  CT a/P shows long segment of circumferential wall thickening of the terminal and distal ileum with obstruction at ileum.  NG tube was placed in the ED for gastric decompression.  Patient has been admitted to internal medicine team.  General surgery team consulted for evaluation given bowel obstruction.    Review of Systems:  12 point ROS negative except as stated in HPI    PAST HISTORY:   Past medical history:  Past Medical History:   Diagnosis Date    Crohn's colitis        Past surgical history:  Past Surgical History:   Procedure Laterality Date    CHOLECYSTECTOMY      TONSILLECTOMY         Family history:  Family History   Problem Relation Age of Onset    Scoliosis Mother     Heart disease Mother     Scoliosis Brother     Breast cancer Maternal Grandmother     Diabetes  "Paternal Grandmother        Social history:  Social History     Socioeconomic History    Marital status:    Tobacco Use    Smoking status: Former     Types: Vaping with nicotine    Smokeless tobacco: Never   Substance and Sexual Activity    Alcohol use: Not Currently    Drug use: Yes     Types: Marijuana     Comment: medical marijuana     Social History     Tobacco Use   Smoking Status Former    Types: Vaping with nicotine   Smokeless Tobacco Never      Social History     Substance and Sexual Activity   Alcohol Use Not Currently        MEDICATIONS & ALLERGIES:     No current facility-administered medications on file prior to encounter.     Current Outpatient Medications on File Prior to Encounter   Medication Sig    BD ALLERGIST TRAY REG BEVEL 1 mL 27 x 1/2" Syrg USE TWO syringes WEEKLY    cetirizine (ZYRTEC) 10 MG tablet Take 10 mg by mouth.    fluconazole (DIFLUCAN) 150 MG Tab Take by mouth.    levocetirizine dihydrochloride (XYZAL ORAL) Take by mouth.    pantoprazole (PROTONIX) 40 MG tablet Take 40 mg by mouth once daily.    traMADoL (ULTRAM) 50 mg tablet Take 1 tablet (50 mg total) by mouth every 6 (six) hours as needed for Pain. (Patient not taking: Reported on 1/15/2023)    vedolizumab (ENTYVIO IV)   0 Refill(s)       Allergies:   Review of patient's allergies indicates:   Allergen Reactions    Budesonide      Other reaction(s): Stomach ache    Codeine      Other reaction(s): unknown    Azathioprine Nausea And Vomiting       Scheduled Meds:    Continuous Infusions:    PRN Meds:    OBJECTIVE:   Vital Signs:  VITAL SIGNS: 24 HR MIN & MAX LAST   Temp  Min: 98.2 °F (36.8 °C)  Max: 98.2 °F (36.8 °C)  98.2 °F (36.8 °C)   BP  Min: 109/67  Max: 126/78  126/78    Pulse  Min: 59  Max: 86  85    Resp  Min: 20  Max: 28  20    SpO2  Min: 96 %  Max: 100 %  96 %      HT: 5' 6" (167.6 cm)  WT: 62.9 kg (138 lb 10.7 oz)  BMI: 22.4     Intake/output:  Intake/Output - Last 3 Shifts       None          No intake or output " "data in the 24 hours ending 08/30/23 0348      Physical Exam:  General: Well developed, well nourished, no acute distress  HEENT: Normocephalic, atraumatic.  CV: RR  Resp: NWOB on RA  GI:  Abdomen soft, mildly distended, not tender to palpation.  No surgical scars on abdomen.  :  Deferred  MSK:  Moves all extremities spontaneously and purposefully  Skin/wounds:  No rashes, ulcers, erythema  Neuro:  CNII-XII grossly intact, alert and oriented to person, place, and time    Labs:  Troponin:  No results for input(s): "TROPONINI" in the last 72 hours.  CBC:  Recent Labs     08/29/23  2142   WBC 14.80*   RBC 5.44*   HGB 15.2   HCT 44.2   *   MCV 81.3   MCH 27.9   MCHC 34.4     CMP:  Recent Labs     08/29/23 2142   CALCIUM 10.3*   ALBUMIN 4.6      K 3.4*   CO2 21*   BUN 10.3   CREATININE 0.88   ALKPHOS 72   ALT 17   AST 19   BILITOT 0.8     Lactic Acid:  No results for input(s): "LACTATE" in the last 72 hours.  ETOH:  No results for input(s): "ETHANOL" in the last 72 hours.   Urine Drug Screen:  No results for input(s): "COCAINE", "OPIATE", "BARBITURATE", "AMPHETAMINE", "FENTANYL", "CANNABINOIDS", "MDMA" in the last 72 hours.    Invalid input(s): "BENZODIAZEPINE", "PHENCYCLIDINE"   ABG:  No results for input(s): "PH", "PCO2", "PO2", "HCO3", "BE", "POCSATURATED" in the last 72 hours.    Diagnostic Results:  CT Abdomen Pelvis With Contrast         XR Gastric tube check, non-radiologist performed    (Results Pending)       ASSESSMENT & PLAN:    28-year-old female with a history of Crohn's disease who presented with abdominal pain, nausea, vomiting, and no flatus x1 day.  Imaging consistent with segment of ileum Crohn's flare with small bowel obstruction.    - no acute surgical intervention at this time  - continue NG tube to low intermittent wall suction  - NPO, mIVF   - Strict I's and O's  - Replace electrolytes as needed  - Remainder of plan of care per primary team    Rasheeda Gonzales MD   [employer] General Surgery, " PGY-2

## 2023-08-30 NOTE — ED PROVIDER NOTES
Encounter Date: 8/29/2023       History     Chief Complaint   Patient presents with    Abdominal Pain     Pt reports severe abdominal pain , vomiting starting today, pt reports approx  5-6 episodes, pt reports hx of chrohns, last BM today, not normal BM per pt. Pt is extremely anxious , tachypneic, diaphoretic. GI specialist told her to come to ED and be checked for obstruction.      28-year-old female with a history of Crohn's disease presents to the emergency department for evaluation of diffuse abdominal pain, nausea and vomiting starting earlier today after lunch.  Reports she passed a very small amount of stool; however, tried to drink some water intake some Maalox but symptoms progressively worsened.  She discussed with her GI on-call who advised coming to the emergency department for evaluate for potential obstruction.  No noted fever or chills.    The history is provided by the patient.   Abdominal Pain  The current episode started several hours ago. The onset of the illness was gradual. The problem has been gradually worsening. The abdominal pain is generalized. The abdominal pain is relieved by nothing. The other symptoms of the illness include nausea and vomiting. The other symptoms of the illness do not include fever or melena.   Additional symptoms associated with the illness include constipation. Symptoms associated with the illness do not include diaphoresis.     Review of patient's allergies indicates:   Allergen Reactions    Budesonide      Other reaction(s): Stomach ache    Codeine      Other reaction(s): unknown    Azathioprine Nausea And Vomiting     Past Medical History:   Diagnosis Date    Crohn's colitis      Past Surgical History:   Procedure Laterality Date    CHOLECYSTECTOMY      TONSILLECTOMY       Family History   Problem Relation Age of Onset    Scoliosis Mother     Heart disease Mother     Scoliosis Brother     Breast cancer Maternal Grandmother     Diabetes Paternal Grandmother       Social History     Tobacco Use    Smoking status: Former     Types: Vaping with nicotine    Smokeless tobacco: Never   Substance Use Topics    Alcohol use: Not Currently    Drug use: Yes     Types: Marijuana     Comment: medical marijuana     Review of Systems   Constitutional:  Negative for diaphoresis and fever.   Gastrointestinal:  Positive for abdominal pain, constipation, nausea and vomiting. Negative for melena.       Physical Exam     Initial Vitals [08/29/23 2127]   BP Pulse Resp Temp SpO2   114/73 86 (!) 28 98.2 °F (36.8 °C) 98 %      MAP       --         Physical Exam    Nursing note and vitals reviewed.  Constitutional: She appears well-developed and well-nourished. No distress.   HENT:   Head: Normocephalic and atraumatic.   Mouth/Throat: Oropharynx is clear and moist.   Eyes: Conjunctivae are normal. No scleral icterus.   Neck:   Normal range of motion.  Cardiovascular:  Normal rate and regular rhythm.           Pulmonary/Chest: Breath sounds normal. No respiratory distress.   Abdominal: Abdomen is soft. There is abdominal tenderness.   High pitched, hyperactive bowel sounds in RUQ, LUQ, RLQ   Musculoskeletal:         General: No edema.      Cervical back: Normal range of motion.     Neurological: She is alert and oriented to person, place, and time. GCS score is 15. GCS eye subscore is 4. GCS verbal subscore is 5. GCS motor subscore is 6.   Skin: Skin is warm and dry.         ED Course   Procedures  Labs Reviewed   COMPREHENSIVE METABOLIC PANEL - Abnormal; Notable for the following components:       Result Value    Potassium Level 3.4 (*)     Carbon Dioxide 21 (*)     Glucose Level 125 (*)     Calcium Level Total 10.3 (*)     All other components within normal limits   URINALYSIS, REFLEX TO URINE CULTURE - Abnormal; Notable for the following components:    Protein, UA Trace (*)     Ketones, UA 4+ (*)     Leukocyte Esterase, UA 1+ (*)     All other components within normal limits   CBC WITH  DIFFERENTIAL - Abnormal; Notable for the following components:    WBC 14.80 (*)     RBC 5.44 (*)     Platelet 406 (*)     Neut # 12.82 (*)     All other components within normal limits   URINALYSIS, MICROSCOPIC - Normal   PREGNANCY TEST, URINE RAPID - Normal   C-REACTIVE PROTEIN - Normal   CBC W/ AUTO DIFFERENTIAL    Narrative:     The following orders were created for panel order CBC auto differential.  Procedure                               Abnormality         Status                     ---------                               -----------         ------                     CBC with Differential[[id number]]        Abnormal            Final result                 Please view results for these tests on the individual orders.          Imaging Results              XR Gastric tube check, non-radiologist performed (Final result)  Result time 08/30/23 07:36:09   Procedure changed from X-Ray Abdomen AP 1 View     Final result by Doni Alcocer MD (08/30/23 07:36:09)                   Impression:      Satisfactory enteric tube positioning      Electronically signed by: Doni Alcocer MD  Date:    08/30/2023  Time:    07:36               Narrative:    EXAMINATION:  One-view upper abdomen    CLINICAL HISTORY:  Gastric tube check    COMPARISON:  CT abdomen and pelvis 08/30/2023    FINDINGS:  Enteric tube projects past the GE junction with its tip in side port over the left upper quadrant in the expected region of the stomach.                                       CT Abdomen Pelvis With Contrast (Final result)  Result time 08/30/23 07:57:03      Final result by Watson Gonzalez MD (08/30/23 07:57:03)                   Impression:    Impression:    1. There is long segment circumferential wall thickening of the terminal and distal ileum (series 2, image 106). There is long segment moderately distended small bowel loops in the left mid and lower abdomen proximal to the thickened segment. These findings suggest an acute  exacerbation of Crohns disease with distal small bowel obstruction.    2. Details and other findings as discussed above.    No significant discrepancy with overnight report.      Electronically signed by: Watson Gonzalez  Date:    08/30/2023  Time:    07:57               Narrative:      TECHNIQUE:CT OF THE ABDOMEN AND PELVIS WAS PERFORMED WITH AXIAL IMAGES AS WELL AS SAGITTAL AND CORONAL RECONSTRUCTION IMAGES WITH INTRAVENOUS CONTRAST.    COMPARISON:COMPARISON IS WITH STUDY DATED 2022-09-15 16:52:14.    CLINICAL HISTORY:PT REPORTS SEVERE ABDOMINAL PAIN , VOMITING STARTING TODAY, PT REPORTS APPROX 5-6 EPISODES, PT REPORTS HX OF CHROHNS, LAST BM TODAY, NOT NORMAL BM PER PT. PT IS EXTREMELY ANXIOUS , TACHYPNEIC, DIAPHORETIC. GI SPECIALIST TOLD HER TO COME TO ED AND BE CHECKED FOR OBSTRUCTION. ).    DOSAGE INFORMATION:AUTOMATED EXPOSURE CONTROL WAS UTILIZED.      Findings:    Thorax:    Lungs:The visualized lung bases appear unremarkable.    Pleura:No effusions or thickening are seen.    Heart:The heart size is within normal limits.    Abdomen:    Abdominal Wall:No abdominal wall pathology is seen.    Liver:The liver appears unremarkable.    Biliary System:No intrahepatic or extrahepatic biliary duct dilatation is seen.    Gallbladder:Surgical clips are seen in the gallbladder fossa consistent with prior cholecystectomy.    Pancreas:The pancreas appears unremarkable.    Spleen:The spleen appears unremarkable.    Adrenals:The adrenal glands appear unremarkable.    Kidneys:The kidneys appear unremarkable with no stones cysts masses or hydronephrosis.    Aorta:The abdominal aorta appears unremarkable.    Bowel:    Esophagus:The visualized esophagus appears unremarkable.    Stomach:The stomach appears unremarkable.    Duodenum:Unremarkable appearing duodenum.    Small Bowel:There is long segment circumferential wall thickening of the terminal and distal ileum (series 2, image 106). The prior study also demonstrates  distal ileal wall thickening. There is long segment moderately distended small bowel loops in the left mid and lower abdomen proximal to the thickened segment. These findings suggest an acute exacerbation of Crohns disease with distal small bowel obstruction. The remainder of the small bowel loops appear unremarkable.    Colon:Nondistended.    Appendix:No appendix is identified.    Peritoneum:No free intraperitoneal air is seen. Minimal free fluid is seen in the pelvis.    Pelvis:    Bladder:The bladder appears unremarkable.    Female:    Uterus:The uterus appears unremarkable.    Ovaries:No adnexal masses are seen.    Bony structures:    Dorsal Spine:The visualized dorsal spine appears unremarkable.    Bony Pelvis:The visualized bony structures of the pelvis appear unremarkable.    Notifications:The results were discussed with the emergency room physician Dr. San prior to dictation at 2023-08-30 01:57:58 CDT.                        Preliminary result by Watson Gonzalez MD (08/30/23 01:23:29)                   Narrative:    START OF REPORT:  TECHNIQUE: CT OF THE ABDOMEN AND PELVIS WAS PERFORMED WITH AXIAL IMAGES AS WELL AS SAGITTAL AND CORONAL RECONSTRUCTION IMAGES WITH INTRAVENOUS CONTRAST.    COMPARISON: COMPARISON IS WITH STUDY DATED 2022-09-15 16:52:14.    CLINICAL HISTORY: PT REPORTS SEVERE ABDOMINAL PAIN , VOMITING STARTING TODAY, PT REPORTS APPROX 5-6 EPISODES, PT REPORTS HX OF CHROHNS, LAST BM TODAY, NOT NORMAL BM PER PT. PT IS EXTREMELY ANXIOUS , TACHYPNEIC, DIAPHORETIC. GI SPECIALIST TOLD HER TO COME TO ED AND BE CHECKED FOR OBSTRUCTION. ).    DOSAGE INFORMATION: AUTOMATED EXPOSURE CONTROL WAS UTILIZED.    Findings:  Thorax:  Lungs: The visualized lung bases appear unremarkable.  Pleura: No effusions or thickening are seen.  Heart: The heart size is within normal limits.  Abdomen:  Abdominal Wall: No abdominal wall pathology is seen.  Liver: The liver appears unremarkable.  Biliary System: No  intrahepatic or extrahepatic biliary duct dilatation is seen.  Gallbladder: Surgical clips are seen in the gallbladder fossa consistent with prior cholecystectomy.  Pancreas: The pancreas appears unremarkable.  Spleen: The spleen appears unremarkable.  Adrenals: The adrenal glands appear unremarkable.  Kidneys: The kidneys appear unremarkable with no stones cysts masses or hydronephrosis.  Aorta: The abdominal aorta appears unremarkable.  IVC: Unremarkable.  Bowel:  Esophagus: The visualized esophagus appears unremarkable.  Stomach: The stomach appears unremarkable.  Duodenum: Unremarkable appearing duodenum.  Small Bowel: There is long segment circumferential wall thickening of the terminal and distal ileum (series 2, image 106). The prior study also demonstrates distal ileal wall thickening. There is long segment moderately distended small bowel loops in the left mid and lower abdomen proximal to the thickened segment. These findings suggest an acute exacerbation of Crohns disease with distal small bowel obstruction. The remainder of the small bowel loops appear unremarkable.  Colon: Nondistended.  Appendix: No appendix is identified.  Peritoneum: No free intraperitoneal air is seen. Minimal free fluid is seen in the pelvis.    Pelvis:  Bladder: The bladder appears unremarkable.  Female:  Uterus: The uterus appears unremarkable.  Ovaries: No adnexal masses are seen.    Bony structures:  Dorsal Spine: The visualized dorsal spine appears unremarkable.  Bony Pelvis: The visualized bony structures of the pelvis appear unremarkable.    Notifications: The results were discussed with the emergency room physician Dr. San prior to dictation at 2023-08-30 01:57:58 CDT.      Impression:  1. There is long segment circumferential wall thickening of the terminal and distal ileum (series 2, image 106). There is long segment moderately distended small bowel loops in the left mid and lower abdomen proximal to the thickened  segment. These findings suggest an acute exacerbation of Crohns disease with distal small bowel obstruction.  2. Details and other findings as discussed above.                                         Medications - No data to display  Medical Decision Making  Problems Addressed:  Crohn's disease of colon with intestinal obstruction: acute illness or injury  Terminal ileitis: acute illness or injury    Amount and/or Complexity of Data Reviewed  Labs: ordered.  Radiology: ordered.    Risk  OTC drugs.  Prescription drug management.  Decision regarding hospitalization.      ED assessment:    Ms. Romero presented for evaluation of worsening abdominal pain, nausea, vomiting in the setting of Crohn's disease.  Had a small bowel movements a day, not her usual.  No noted fevers.  Abdominal tenderness, diaphoresis, hyperactive bowel sounds on examination.    Differential diagnosis (including but not limited to):   Small-bowel obstruction, terminal ileitis, colitis, perforation, abscess, acute kidney injury, electrolyte derangements, gastroenteritis    ED management:   Laboratory studies with mild leukocytosis, hypokalemia otherwise unremarkable.  Unfortunately, CT scan demonstrates long segment of circumferential wall thickening of the terminal and distal ileum with associated long segment of moderately distended small bowel loops in the left mid and lower abdomen proximal to the thickened segment concerning for Crohn's exacerbation and developing small-bowel obstruction.  Case discussed with General surgery who evaluated the patient the bedside, advised against surgical intervention if possible.  Recommended NG tube placement, admit to Medicine with GI consultation.  Case discussed with hospital medicine accepts for admission    My independent radiology interpretation: \  CT abdomen and pelvis:  Dilated loops of small bowel with a inflammatory changes in the right lower quadrant, no free air or fluid collection  noted      Amount and/or Complexity of Data Reviewed  Independent historian: none   Summary of history:   External data reviewed: notes from clinic visits  Summary of data reviewed:  Follows outpatient with Colorectal surgery Dr. Capo gerardo through.  Last ED visit September of last year, no recent inpatient admissions in record.  Risk and benefits of testing: discussed   Labs: ordered and reviewed  Radiology: ordered and independent interpretation performed (see above or ED course)    Discussion of management or test interpretation with external provider(s): discussed with hospitalist physician and discussed with General surgery consultant   Summary of discussion:  As summarized above    Risk  Parenteral controlled substances   Decision regarding hospitalization  Shared decision making     Critical Care  none    I, Joaquina San MD personally performed the history, PE, MDM, and procedures as documented above and agree with the scribe's documentation.              ED Course as of 09/20/23 0502   Wed Aug 30, 2023   0211 Discussed with General surgery, Dr. Gonzales, will re-evaluate at bedside. [KS]      ED Course User Index  [KS] Joaquina San MD                    Clinical Impression:   Final diagnoses:  [K50.00] Terminal ileitis (Primary)  [K50.012] Crohn's disease of ileum with intestinal obstruction        ED Disposition Condition    Admit Stable                Joaquina San MD  09/20/23 0502

## 2023-08-30 NOTE — NURSING
Nurses Note -- 4 Eyes      8/30/2023   6:56 PM      Skin assessed during: Admit      [x] No Altered Skin Integrity Present    []Prevention Measures Documented      [] Yes- Altered Skin Integrity Present or Discovered   [] LDA Added if Not in Epic (Describe Wound)   [] New Altered Skin Integrity was Present on Admit and Documented in LDA   [] Wound Image Taken    Wound Care Consulted? No    Attending Nurse:  Niharika Gomez LPN    Second RN/Staff Member:   BOOM Carroll

## 2023-08-30 NOTE — H&P
[facility] Hospital Medicine History & Physical Examination       Patient Name: Josselyn Romero  MRN: [id number]  Patient Class: IP- Inpatient   Admission Date: 8/29/2023   Admitting Physician: SHARITA Service   Length of Stay: 0  Attending Physician: Dr. Gilmar Avery  Primary Care Provider: Susi Christianson DO  Face-to-Face encounter date: 08/30/2023  Code Status: Full code  Chief Complaint: Abdominal Pain (Pt reports severe abdominal pain , vomiting starting today, pt reports approx  5-6 episodes, pt reports hx of chrohns, last BM today, not normal BM per pt. Pt is extremely anxious , tachypneic, diaphoretic. GI specialist told her to come to ED and be checked for obstruction. )        Patient information was obtained from patient, patient's family, past medical records and ER records.     HISTORY OF PRESENT ILLNESS:   Josselyn Romero is a 28 y.o. female who  PMH includes Crohn's disease; presents to the ED at [facility] on 8/29/2023 with a primary complaint of abdominal pain nausea with vomiting over the past day.  Patient reports she feels as if she is having a Crohn's exacerbation.  She does report eating food-she does not typically eat on a regular basis.  Patient is currently followed outpatient by GI Services and is on Entyvio infusions which she reports has been helping with her exacerbations.  Patient reports her symptoms of abdominal pain with bloating and associated vomiting started over the past day.  Patient reports she called her GI specialist Dr. Murphy who instructed her to come to the ED for further evaluation.  No reports of chest pain, syncope, dizziness, fever, chills cough, congestion, or any sick contacts.  Lab work reviewed demonstrated  WBCs 14.80, potassium 3.4, glucose 125, other indices unremarkable.  CT of the abdomen and pelvis with contrast impression reviewed demonstrated long segment circumferential wall thickening of the terminal and distal ileum, long segment moderately  "distended small bowel loops in the left mid and lower abdomen proximal to the thickened segment suggest acute exacerbation of Crohn disease with distal small-bowel obstruction.    Initial vital signs reviewed /73 pulse 86 respirations 28 temperature 98.2° F O2 saturation 98% on room air.  Patient did receive IV hydration and pain medication in the ED which her respiratory rate did trend down to 16.  Surgery and GI Services were consulted.  Patient had NG tube placed to low intermittent wall suction.  Patient is admitted to hospital medicine services for further management.      PAST MEDICAL HISTORY:     Past Medical History:   Diagnosis Date    Crohn's colitis        PAST SURGICAL HISTORY:   Cholecystectomy  Tonsillectomy   Rectal surgery for anal fissures     ALLERGIES:   Budesonide, Codeine, and Azathioprine    FAMILY HISTORY:   Reviewed and negative    SOCIAL HISTORY:   Denies any recent tobacco use   Denies any illicit drug use  Denies any alcohol use   Lives with family     HOME MEDICATIONS:   As documented  Prior to Admission medications    Medication Sig Start Date End Date Taking? Authorizing Provider   BD ALLERGIST TRAY REG BEVEL 1 mL 27 x 1/2" Syrg USE TWO syringes WEEKLY 3/29/22   Provider, Historical   cetirizine (ZYRTEC) 10 MG tablet Take 10 mg by mouth. 8/5/21   Provider, Historical   fluconazole (DIFLUCAN) 150 MG Tab Take by mouth. 1/3/23   Provider, Historical   levocetirizine dihydrochloride (XYZAL ORAL) Take by mouth.    Provider, Historical   pantoprazole (PROTONIX) 40 MG tablet Take 40 mg by mouth once daily. 5/10/22   Provider, Historical   traMADoL (ULTRAM) 50 mg tablet Take 1 tablet (50 mg total) by mouth every 6 (six) hours as needed for Pain.  Patient not taking: Reported on 1/15/2023 9/15/22   Suman Downing MD   vedolizumab (ENTYVIO IV)   0 Refill(s) 9/8/21   Provider, Historical       REVIEW OF SYSTEMS:   Except as documented, all other systems reviewed and negative     PHYSICAL " EXAM:     VITAL SIGNS: 24 HRS MIN & MAX LAST   Temp  Min: 98.1 °F (36.7 °C)  Max: 98.2 °F (36.8 °C) 98.1 °F (36.7 °C)   BP  Min: 97/73  Max: 126/78 107/65   Pulse  Min: 46  Max: 86  (!) 46 (notfifed physician)   Resp  Min: 16  Max: 28 16   SpO2  Min: 96 %  Max: 100 % 100 %       General appearance: Well-developed, well-nourished female, complaints of abdominal pain, non-toxic, family at bedside  HENT: Atraumatic head. Moist mucous membranes of oral cavity.  Eyes: PERRL  Neck: Supple.   Lungs: Clear to auscultation bilaterally. No wheezing present.   Heart: Regular rate and rhythm. S1 and S2 present with no murmurs/gallop/rub. No pedal edema. No JVD present.   Abdomen: Soft, non-distended,generalized abdominal pain; no rebound tenderness/guarding. Bowel sounds sluggish, NG tube in place to LIWS  Extremities: No cyanosis, clubbing,VUONG, generalized weakness  Skin: warm and dry  Neuro: AAOx3, no focal deficits  Status: Appropriate mood and affect. Responds appropriately to questions.     LABS AND IMAGING:     Recent Labs   Lab 08/29/23  2142   WBC 14.80*   RBC 5.44*   HGB 15.2   HCT 44.2   MCV 81.3   MCH 27.9   MCHC 34.4   RDW 12.7   *   MPV 8.7       Recent Labs   Lab 08/29/23  2142      K 3.4*   CO2 21*   BUN 10.3   CREATININE 0.88   CALCIUM 10.3*   ALBUMIN 4.6   ALKPHOS 72   ALT 17   AST 19   BILITOT 0.8       Microbiology Results (last 7 days)       ** No results found for the last 168 hours. **             CT Abdomen Pelvis With Contrast  Narrative: TECHNIQUE:CT OF THE ABDOMEN AND PELVIS WAS PERFORMED WITH AXIAL IMAGES AS WELL AS SAGITTAL AND CORONAL RECONSTRUCTION IMAGES WITH INTRAVENOUS CONTRAST.    COMPARISON:COMPARISON IS WITH STUDY DATED 2022-09-15 16:52:14.    CLINICAL HISTORY:PT REPORTS SEVERE ABDOMINAL PAIN , VOMITING STARTING TODAY, PT REPORTS APPROX 5-6 EPISODES, PT REPORTS HX OF CHROHNS, LAST BM TODAY, NOT NORMAL BM PER PT. PT IS EXTREMELY ANXIOUS , TACHYPNEIC, DIAPHORETIC. GI SPECIALIST  TOLD HER TO COME TO ED AND BE CHECKED FOR OBSTRUCTION. ).    DOSAGE INFORMATION:AUTOMATED EXPOSURE CONTROL WAS UTILIZED.      Findings:    Thorax:    Lungs:The visualized lung bases appear unremarkable.    Pleura:No effusions or thickening are seen.    Heart:The heart size is within normal limits.    Abdomen:    Abdominal Wall:No abdominal wall pathology is seen.    Liver:The liver appears unremarkable.    Biliary System:No intrahepatic or extrahepatic biliary duct dilatation is seen.    Gallbladder:Surgical clips are seen in the gallbladder fossa consistent with prior cholecystectomy.    Pancreas:The pancreas appears unremarkable.    Spleen:The spleen appears unremarkable.    Adrenals:The adrenal glands appear unremarkable.    Kidneys:The kidneys appear unremarkable with no stones cysts masses or hydronephrosis.    Aorta:The abdominal aorta appears unremarkable.    Bowel:    Esophagus:The visualized esophagus appears unremarkable.    Stomach:The stomach appears unremarkable.    Duodenum:Unremarkable appearing duodenum.    Small Bowel:There is long segment circumferential wall thickening of the terminal and distal ileum (series 2, image 106). The prior study also demonstrates distal ileal wall thickening. There is long segment moderately distended small bowel loops in the left mid and lower abdomen proximal to the thickened segment. These findings suggest an acute exacerbation of Crohns disease with distal small bowel obstruction. The remainder of the small bowel loops appear unremarkable.    Colon:Nondistended.    Appendix:No appendix is identified.    Peritoneum:No free intraperitoneal air is seen. Minimal free fluid is seen in the pelvis.    Pelvis:    Bladder:The bladder appears unremarkable.    Female:    Uterus:The uterus appears unremarkable.    Ovaries:No adnexal masses are seen.    Bony structures:    Dorsal Spine:The visualized dorsal spine appears unremarkable.    Bony Pelvis:The visualized bony  structures of the pelvis appear unremarkable.    Notifications:The results were discussed with the emergency room physician Dr. San prior to dictation at 2023-08-30 01:57:58 CDT.  Impression: Impression:    1. There is long segment circumferential wall thickening of the terminal and distal ileum (series 2, image 106). There is long segment moderately distended small bowel loops in the left mid and lower abdomen proximal to the thickened segment. These findings suggest an acute exacerbation of Crohns disease with distal small bowel obstruction.    2. Details and other findings as discussed above.    No significant discrepancy with overnight report.    Electronically signed by: Watson Gonzalez  Date:    08/30/2023  Time:    07:57  XR Gastric tube check, non-radiologist performed  Narrative: EXAMINATION:  One-view upper abdomen    CLINICAL HISTORY:  Gastric tube check    COMPARISON:  CT abdomen and pelvis 08/30/2023    FINDINGS:  Enteric tube projects past the GE junction with its tip in side port over the left upper quadrant in the expected region of the stomach.  Impression: Satisfactory enteric tube positioning    Electronically signed by: Doni Alcocer MD  Date:    08/30/2023  Time:    07:36        ASSESSMENT & PLAN:   ASSESSMENT:  Acute small-bowel obstruction-POA   Acute Crohn's exacerbation-POA   Acute abdominal pain-intractable-POA  Leukocytosis-suspect secondary to Crohn's exacerbation-POA  Nausea with vomiting-POA   Hypokalemia-POA   Hyperglycemia-POA   Weakness- POA    PLAN:  Consult GI Services appreciate assistance and recommendations   Consult surgery Services appreciate assistance and recommendations   IV hydration   PRN pain management   Continue with NG tube per low intermittent wall suction, and gastric decompression per GI and surgery direction  Accurate I&O  PRN antiemetic therapy  Repeat lab work in a.m.       VTE Prophylaxis:  SCD for DVT prophylaxis and will be advised to be as mobile as  possible     Patient condition:  Stable    __________________________________________________________________________  INPATIENT LIST OF MEDICATIONS     Scheduled Meds:   bisacodyL  10 mg Rectal Daily    pantoprazole  40 mg Intravenous Daily     Continuous Infusions:   lactated ringers 125 mL/hr at 08/30/23 0912     PRN Meds:.acetaminophen, acetaminophen, acetaminophen, ketorolac, ketorolac, naloxone, ondansetron, prochlorperazine, sodium chloride 0.9%      IDanial FNP have reviewed and discussed the case with Dr. Gilmar Avery. Please see the following addendum for further assessment and plan from there attending MD.  JOVAN Wheeler   08/30/2023    Seen and examined the patient.  Agree with above history physical exam and management.    Patient is having vomiting abdominal pain for a day she was also intermittently constipated previous to these symptoms.    In the ER she was found to have terminal ileal obstruction.  She was placed NG tube.    General surgery and Gastroenterology was called.  For now no acute surgical interventions.  We will continue NG tube to suction.    1 L bolus LR, 125 cc/hour LR afterwards.  Watch pressures patient is dehydrated   -CBC CMP and Mag tomorrow tiffanie Avery M.D.  [facility]/Hospital Medicine Department  [facility]

## 2023-08-30 NOTE — PLAN OF CARE
Pt is , 3 children, no living will or POA. No PCP.    08/30/23 1212   Discharge Assessment   Assessment Type Discharge Planning Assessment   Confirmed/corrected address, phone number and insurance Yes   Confirmed Demographics Correct on Facesheet   Source of Information patient   When was your last doctors appointment?   (No PCP)   Communicated CATHY with patient/caregiver Date not available/Unable to determine   People in Home child(darryn), dependent;spouse   Do you expect to return to your current living situation? Yes   Do you have help at home or someone to help you manage your care at home? Yes   Who are your caregiver(s) and their phone number(s)?  Rajan [phone]   Prior to hospitilization cognitive status: Unable to Assess   Current cognitive status: Alert/Oriented   Walking or Climbing Stairs   (none)   Dressing/Bathing   (none)   Home Accessibility stairs to enter home   Number of Stairs, Main Entrance three   Home Layout Able to live on 1st floor   Equipment Currently Used at Home none   Readmission within 30 days? No   Patient currently being followed by outpatient case management? No   Do you currently have service(s) that help you manage your care at home? No   Do you take prescription medications? Yes  ([facility] pharmacy in [facility])   Do you have prescription coverage? Yes   Coverage BCBS   Do you have any problems affording any of your prescribed medications? No   Is the patient taking medications as prescribed? yes   Who is going to help you get home at discharge?    How do you get to doctors appointments? family or friend will provide;car, drives self   Are you on dialysis? No   Do you take coumadin? No   DME Needed Upon Discharge    (TBD)   Discharge Plan discussed with: Patient   Transition of Care Barriers None   Discharge Plan A Other  (TBD)   Physical Activity   On average, how many days per week do you engage in moderate to strenuous exercise (like a brisk walk)? 3 days    On average, how many minutes do you engage in exercise at this level? 20 min   Financial Resource Strain   How hard is it for you to pay for the very basics like food, housing, medical care, and heating? Not hard   Housing Stability   In the last 12 months, was there a time when you were not able to pay the mortgage or rent on time? N   In the last 12 months, how many places have you lived? 1   In the last 12 months, was there a time when you did not have a steady place to sleep or slept in a shelter (including now)? N   Transportation Needs   In the past 12 months, has lack of transportation kept you from medical appointments or from getting medications? no   In the past 12 months, has lack of transportation kept you from meetings, work, or from getting things needed for daily living? No   Food Insecurity   Within the past 12 months, you worried that your food would run out before you got the money to buy more. Never true   Within the past 12 months, the food you bought just didn't last and you didn't have money to get more. Never true   Stress   Do you feel stress - tense, restless, nervous, or anxious, or unable to sleep at night because your mind is troubled all the time - these days? Not at all   Social Connections   In a typical week, how many times do you talk on the phone with family, friends, or neighbors? More than 3   How often do you get together with friends or relatives? More than 3   How often do you attend Gnosticist or Uatsdin services? Never   Do you belong to any clubs or organizations such as Gnosticist groups, unions, fraternal or athletic groups, or school groups? No   How often do you attend meetings of the clubs or organizations you belong to? Never   Are you , , , , never , or living with a partner?    Alcohol Use   Q1: How often do you have a drink containing alcohol? Never   Q2: How many drinks containing alcohol do you have on a typical day when  you are drinking? None   Q3: How often do you have six or more drinks on one occasion? Never   OTHER   Name(s) of People in Home  Rajan and children

## 2023-08-31 LAB
ALBUMIN SERPL-MCNC: 3.1 G/DL (ref 3.5–5)
ALBUMIN/GLOB SERPL: 1.6 RATIO (ref 1.1–2)
ALP SERPL-CCNC: 51 UNIT/L (ref 40–150)
ALT SERPL-CCNC: 11 UNIT/L (ref 0–55)
AST SERPL-CCNC: 12 UNIT/L (ref 5–34)
BASOPHILS # BLD AUTO: 0.02 X10(3)/MCL
BASOPHILS NFR BLD AUTO: 0.3 %
BILIRUB SERPL-MCNC: 0.6 MG/DL
BUN SERPL-MCNC: 11.6 MG/DL (ref 7–18.7)
CALCIUM SERPL-MCNC: 8.2 MG/DL (ref 8.4–10.2)
CHLORIDE SERPL-SCNC: 109 MMOL/L (ref 98–107)
CO2 SERPL-SCNC: 23 MMOL/L (ref 22–29)
CREAT SERPL-MCNC: 0.65 MG/DL (ref 0.55–1.02)
EOSINOPHIL # BLD AUTO: 0.06 X10(3)/MCL (ref 0–0.9)
EOSINOPHIL NFR BLD AUTO: 0.8 %
ERYTHROCYTE [DISTWIDTH] IN BLOOD BY AUTOMATED COUNT: 12.7 % (ref 11.5–17)
GFR SERPLBLD CREATININE-BSD FMLA CKD-EPI: >60 MLS/MIN/1.73/M2
GLOBULIN SER-MCNC: 2 GM/DL (ref 2.4–3.5)
GLUCOSE SERPL-MCNC: 65 MG/DL (ref 74–100)
HCT VFR BLD AUTO: 40.9 % (ref 37–47)
HGB BLD-MCNC: 12.7 G/DL (ref 12–16)
IMM GRANULOCYTES # BLD AUTO: 0.04 X10(3)/MCL (ref 0–0.04)
IMM GRANULOCYTES NFR BLD AUTO: 0.5 %
LYMPHOCYTES # BLD AUTO: 1.4 X10(3)/MCL (ref 0.6–4.6)
LYMPHOCYTES NFR BLD AUTO: 18 %
MAGNESIUM SERPL-MCNC: 1.8 MG/DL (ref 1.6–2.6)
MCH RBC QN AUTO: 26.8 PG (ref 27–31)
MCHC RBC AUTO-ENTMCNC: 31.1 G/DL (ref 33–36)
MCV RBC AUTO: 86.5 FL (ref 80–94)
MONOCYTES # BLD AUTO: 0.42 X10(3)/MCL (ref 0.1–1.3)
MONOCYTES NFR BLD AUTO: 5.4 %
NEUTROPHILS # BLD AUTO: 5.85 X10(3)/MCL (ref 2.1–9.2)
NEUTROPHILS NFR BLD AUTO: 75 %
NRBC BLD AUTO-RTO: 0 %
PLATELET # BLD AUTO: 263 X10(3)/MCL (ref 130–400)
PMV BLD AUTO: 9 FL (ref 7.4–10.4)
POTASSIUM SERPL-SCNC: 3.7 MMOL/L (ref 3.5–5.1)
PROT SERPL-MCNC: 5.1 GM/DL (ref 6.4–8.3)
RBC # BLD AUTO: 4.73 X10(6)/MCL (ref 4.2–5.4)
SODIUM SERPL-SCNC: 141 MMOL/L (ref 136–145)
WBC # SPEC AUTO: 7.79 X10(3)/MCL (ref 4.5–11.5)

## 2023-08-31 PROCEDURE — 83735 ASSAY OF MAGNESIUM: CPT | Performed by: STUDENT IN AN ORGANIZED HEALTH CARE EDUCATION/TRAINING PROGRAM

## 2023-08-31 PROCEDURE — 63600175 PHARM REV CODE 636 W HCPCS: Performed by: STUDENT IN AN ORGANIZED HEALTH CARE EDUCATION/TRAINING PROGRAM

## 2023-08-31 PROCEDURE — 25500020 PHARM REV CODE 255: Performed by: INTERNAL MEDICINE

## 2023-08-31 PROCEDURE — C9113 INJ PANTOPRAZOLE SODIUM, VIA: HCPCS | Performed by: STUDENT IN AN ORGANIZED HEALTH CARE EDUCATION/TRAINING PROGRAM

## 2023-08-31 PROCEDURE — 63600175 PHARM REV CODE 636 W HCPCS: Performed by: NURSE PRACTITIONER

## 2023-08-31 PROCEDURE — 85025 COMPLETE CBC W/AUTO DIFF WBC: CPT | Performed by: NURSE PRACTITIONER

## 2023-08-31 PROCEDURE — 11000001 HC ACUTE MED/SURG PRIVATE ROOM

## 2023-08-31 PROCEDURE — 25000003 PHARM REV CODE 250: Performed by: NURSE PRACTITIONER

## 2023-08-31 PROCEDURE — 80053 COMPREHEN METABOLIC PANEL: CPT | Performed by: NURSE PRACTITIONER

## 2023-08-31 RX ADMIN — PANTOPRAZOLE SODIUM 40 MG: 40 INJECTION, POWDER, FOR SOLUTION INTRAVENOUS at 09:08

## 2023-08-31 RX ADMIN — SODIUM CHLORIDE, POTASSIUM CHLORIDE, SODIUM LACTATE AND CALCIUM CHLORIDE: 600; 310; 30; 20 INJECTION, SOLUTION INTRAVENOUS at 01:08

## 2023-08-31 RX ADMIN — DIATRIZOATE MEGLUMINE AND DIATRIZOATE SODIUM 320 ML: 660; 100 LIQUID ORAL; RECTAL at 10:08

## 2023-08-31 RX ADMIN — ACETAMINOPHEN 650 MG: 650 SUPPOSITORY RECTAL at 06:08

## 2023-08-31 RX ADMIN — BISACODYL 10 MG: 10 SUPPOSITORY RECTAL at 08:08

## 2023-08-31 NOTE — PROGRESS NOTES
"   Acute Care Surgery   Progress Note  Admit Date: 8/29/2023  HD#1  POD#* No surgery found *    Subjective:   Interval history:  AFVSS, NAEO    Patient reports feeling better this morning with no abdominal pain. Reports passing gas for about 1/2 of yesterday but none overnight. No Bms overnight    Approximately 800ml NG output    Home Meds:  Current Outpatient Medications   Medication Instructions    BD ALLERGIST TRAY REG BEVEL 1 mL 27 x 1/2" Syrg USE TWO syringes WEEKLY    cetirizine (ZYRTEC) 10 mg, Oral    fluconazole (DIFLUCAN) 150 MG Tab Oral    levocetirizine dihydrochloride (XYZAL ORAL) Oral    pantoprazole (PROTONIX) 40 mg, Oral, Daily    traMADoL (ULTRAM) 50 mg, Oral, Every 6 hours PRN    vedolizumab (ENTYVIO IV)   0 Refill(s)      Scheduled Meds:   bisacodyL  10 mg Rectal Daily    pantoprazole  40 mg Intravenous Daily     Continuous Infusions:   lactated ringers 125 mL/hr at 08/31/23 0136     PRN Meds:acetaminophen, acetaminophen, acetaminophen, ketorolac, ketorolac, naloxone, ondansetron, prochlorperazine, sodium chloride 0.9%     Objective:     VITAL SIGNS: 24 HR MIN & MAX LAST   Temp  Min: 97.5 °F (36.4 °C)  Max: 97.8 °F (36.6 °C)  97.8 °F (36.6 °C)   BP  Min: 97/73  Max: 120/64  107/62    Pulse  Min: 46  Max: 68  (!) 51    Resp  Min: 16  Max: 18  18    SpO2  Min: 96 %  Max: 100 %  100 %      HT: 5' 6" (167.6 cm)  WT: 62.9 kg (138 lb 10.7 oz)  BMI: 22.4     Intake/output:  Intake/Output - Last 3 Shifts         08/29 0700  08/30 0659 08/30 0700 08/31 0659 08/31 0700 09/01 0659    I.V. (mL/kg)  1500 (23.8)     Total Intake(mL/kg)  1500 (23.8)     Drains 300      Other  400     Total Output 300 400     Net -300 +1100            Urine Occurrence  2 x     Emesis Occurrence  400 x             Intake/Output Summary (Last 24 hours) at 8/31/2023 0914  Last data filed at 8/31/2023 0624  Gross per 24 hour   Intake 1500 ml   Output 400 ml   Net 1100 ml           Lines/drains/airway:       Peripheral IV - Single " "Lumen 08/30/23 0050 20 G Right Antecubital (Active)   Site Assessment Clean;Dry;Intact;No redness;No swelling 08/31/23 0400   Extremity Assessment Distal to IV No abnormal discoloration;No redness;No swelling;No warmth 08/30/23 2000   Line Status Infusing 08/31/23 0400   Dressing Status Clean;Dry;Intact 08/31/23 0400   Dressing Intervention Integrity maintained 08/31/23 0400   Number of days: 1            NG/OG Tube 08/30/23 0230 Wise sump 16 Fr. Right nostril (Active)   Placement Check other (see comments) 08/30/23 0800   Distal Tube Length (cm) 61 08/30/23 1822   Tolerance signs/symptoms of discomfort 08/30/23 2000   Securement secured to nostril center w/ adhesive device 08/30/23 2000   Clamp Status/Tolerance unclamped 08/30/23 2000   Suction Setting/Drainage Method moderate;intermittent setting 08/30/23 0800   Insertion Site Appearance no redness, warmth, tenderness, skin breakdown, drainage 08/30/23 0648   Drainage Bile;Green 08/30/23 2000   Tube Output(mL)(Include Discarded Residual) 300 mL 08/30/23 0648   Number of days: 1       Physical examination:  Gen: NAD, AAOx3, answering questions appropriately  HEENT: normocephalic atraumatic  CV: RR  Resp: NWOB  Abd: soft, mildly distended, nontender  Ext: moving all extremities spontaneously and purposefully  Neuro: CN II-XII grossly intact  Skin/wounds: warm, dry    Labs:  Renal:  Recent Labs     08/29/23 2142 08/31/23  0544   BUN 10.3 11.6   CREATININE 0.88 0.65     No results for input(s): "LACTIC" in the last 72 hours.  FENGI:  Recent Labs     08/29/23 2142 08/31/23  0544    141   K 3.4* 3.7   CO2 21* 23   CALCIUM 10.3* 8.2*   MG  --  1.80   ALBUMIN 4.6 3.1*   BILITOT 0.8 0.6   AST 19 12   ALKPHOS 72 51   ALT 17 11     Heme:  Recent Labs     08/29/23 2142 08/31/23  0714   HGB 15.2 12.7   HCT 44.2 40.9   * 263     ID:  Recent Labs     08/29/23 2142 08/31/23  0714   WBC 14.80* 7.79     CBG:  Recent Labs     08/29/23 2142 08/31/23  0544   GLUCOSE " "125* 65*      Cardiovascular:  No results for input(s): "TROPONINI", "CKTOTAL", "CKMB", "BNP" in the last 168 hours.  ABG:  No results for input(s): "PH", "PO2", "PCO2", "HCO3", "BE" in the last 168 hours.   I have reviewed all pertinent lab results within the past 24 hours.    Imaging:  XR Gastric tube check, non-radiologist performed   Final Result      Satisfactory enteric tube positioning         Electronically signed by: Doni Alcocer MD   Date:    08/30/2023   Time:    07:36      CT Abdomen Pelvis With Contrast   Final Result   Impression:      1. There is long segment circumferential wall thickening of the terminal and distal ileum (series 2, image 106). There is long segment moderately distended small bowel loops in the left mid and lower abdomen proximal to the thickened segment. These findings suggest an acute exacerbation of Crohns disease with distal small bowel obstruction.      2. Details and other findings as discussed above.      No significant discrepancy with overnight report.         Electronically signed by: Watson Gonzalez   Date:    08/30/2023   Time:    07:57      XR Small Bowel Follow Through    (Results Pending)      I have reviewed all pertinent imaging results/findings within the past 24 hours.    Micro/Path/Other:  Microbiology Results (last 7 days)       ** No results found for the last 168 hours. **           Specimen (168h ago, onward)      None             Assessment & Plan:   28-year-old female with a history of Crohn's disease who presented 8/29 with abdominal pain, nausea, vomiting, and no flatus for 1 day. CT imaging consistent with segment of ileum Crohn's flare with small bowel obstruction.    - Small bowel follow through today, follow up results  - Continue strict I/Os   - Remaining care per primary    Ricardo Garcia MD  [employer] General Surgery PGY-1    "

## 2023-08-31 NOTE — PROGRESS NOTES
[facility]  Hospital Medicine Progress Note      Chief Complaint:  Abdominal pain, nausea, vomiting    HPI:   28 y.o. female who  PMH includes Crohn's disease; presents to the ED at [facility] on 8/29/2023 with a primary complaint of abdominal pain nausea with vomiting over the past day.  Patient reports she feels as if she is having a Crohn's exacerbation.  She does report eating food-she does not typically eat on a regular basis.  Patient is currently followed outpatient by GI Services and is on Entyvio infusions which she reports has been helping with her exacerbations.  Patient reports her symptoms of abdominal pain with bloating and associated vomiting started over the past day.  Patient reports she called her GI specialist Dr. Murphy who instructed her to come to the ED for further evaluation.  No reports of chest pain, syncope, dizziness, fever, chills cough, congestion, or any sick contacts.  Lab work reviewed demonstrated  WBCs 14.80, potassium 3.4, glucose 125, other indices unremarkable.  CT of the abdomen and pelvis with contrast impression reviewed demonstrated long segment circumferential wall thickening of the terminal and distal ileum, long segment moderately distended small bowel loops in the left mid and lower abdomen proximal to the thickened segment suggest acute exacerbation of Crohn disease with distal small-bowel obstruction.    Initial vital signs reviewed /73 pulse 86 respirations 28 temperature 98.2° F O2 saturation 98% on room air.  Patient did receive IV hydration and pain medication in the ED which her respiratory rate did trend down to 16.  Surgery and GI Services were consulted.  Patient had NG tube placed to low intermittent wall suction.  Patient is admitted to hospital medicine services for further management    Interval Hx:   No overnight events.  No new complaints.  Patient still with NG tube in  place  __________________________________________________________________________________________________________________________________    Objective/physical exam:  Vital signs have been personally reviewed by me   Neuro:  Awake, alert, comprehension intact.  NG tube in place    General: Appears comfortable, no acute distress.  Integumentary: Warm, dry, intact.  Musculoskeletal: Purposeful movement noted.   Respiratory: No accessory muscle use. Breath sounds are equal.  Cardiovascular: Regular rate. No peripheral edema.    VITAL SIGNS: 24 HRS MIN & MAX LAST   Temp  Min: 97.5 °F (36.4 °C)  Max: 97.8 °F (36.6 °C) 97.7 °F (36.5 °C)   BP  Min: 107/62  Max: 122/75 122/75   Pulse  Min: 46  Max: 68  65   Resp  Min: 16  Max: 18 18   SpO2  Min: 95 %  Max: 100 % 95 %     CT Abdomen Pelvis With Contrast  Narrative: TECHNIQUE:CT OF THE ABDOMEN AND PELVIS WAS PERFORMED WITH AXIAL IMAGES AS WELL AS SAGITTAL AND CORONAL RECONSTRUCTION IMAGES WITH INTRAVENOUS CONTRAST.    COMPARISON:COMPARISON IS WITH STUDY DATED 2022-09-15 16:52:14.    CLINICAL HISTORY:PT REPORTS SEVERE ABDOMINAL PAIN , VOMITING STARTING TODAY, PT REPORTS APPROX 5-6 EPISODES, PT REPORTS HX OF CHROHNS, LAST BM TODAY, NOT NORMAL BM PER PT. PT IS EXTREMELY ANXIOUS , TACHYPNEIC, DIAPHORETIC. GI SPECIALIST TOLD HER TO COME TO ED AND BE CHECKED FOR OBSTRUCTION. ).    DOSAGE INFORMATION:AUTOMATED EXPOSURE CONTROL WAS UTILIZED.      Findings:    Thorax:    Lungs:The visualized lung bases appear unremarkable.    Pleura:No effusions or thickening are seen.    Heart:The heart size is within normal limits.    Abdomen:    Abdominal Wall:No abdominal wall pathology is seen.    Liver:The liver appears unremarkable.    Biliary System:No intrahepatic or extrahepatic biliary duct dilatation is seen.    Gallbladder:Surgical clips are seen in the gallbladder fossa consistent with prior cholecystectomy.    Pancreas:The pancreas appears unremarkable.    Spleen:The spleen  appears unremarkable.    Adrenals:The adrenal glands appear unremarkable.    Kidneys:The kidneys appear unremarkable with no stones cysts masses or hydronephrosis.    Aorta:The abdominal aorta appears unremarkable.    Bowel:    Esophagus:The visualized esophagus appears unremarkable.    Stomach:The stomach appears unremarkable.    Duodenum:Unremarkable appearing duodenum.    Small Bowel:There is long segment circumferential wall thickening of the terminal and distal ileum (series 2, image 106). The prior study also demonstrates distal ileal wall thickening. There is long segment moderately distended small bowel loops in the left mid and lower abdomen proximal to the thickened segment. These findings suggest an acute exacerbation of Crohns disease with distal small bowel obstruction. The remainder of the small bowel loops appear unremarkable.    Colon:Nondistended.    Appendix:No appendix is identified.    Peritoneum:No free intraperitoneal air is seen. Minimal free fluid is seen in the pelvis.    Pelvis:    Bladder:The bladder appears unremarkable.    Female:    Uterus:The uterus appears unremarkable.    Ovaries:No adnexal masses are seen.    Bony structures:    Dorsal Spine:The visualized dorsal spine appears unremarkable.    Bony Pelvis:The visualized bony structures of the pelvis appear unremarkable.    Notifications:The results were discussed with the emergency room physician Dr. San prior to dictation at 2023-08-30 01:57:58 CDT.  Impression: Impression:    1. There is long segment circumferential wall thickening of the terminal and distal ileum (series 2, image 106). There is long segment moderately distended small bowel loops in the left mid and lower abdomen proximal to the thickened segment. These findings suggest an acute exacerbation of Crohns disease with distal small bowel obstruction.    2. Details and other findings as discussed above.    No significant discrepancy with overnight  report.    Electronically signed by: Watson Gonzalez  Date:    08/30/2023  Time:    07:57  XR Gastric tube check, non-radiologist performed  Narrative: EXAMINATION:  One-view upper abdomen    CLINICAL HISTORY:  Gastric tube check    COMPARISON:  CT abdomen and pelvis 08/30/2023    FINDINGS:  Enteric tube projects past the GE junction with its tip in side port over the left upper quadrant in the expected region of the stomach.  Impression: Satisfactory enteric tube positioning    Electronically signed by: Doni Alcocer MD  Date:    08/30/2023  Time:    07:36    Recent Labs   Lab 08/29/23 2142 08/31/23  0714   WBC 14.80* 7.79   RBC 5.44* 4.73   HGB 15.2 12.7   HCT 44.2 40.9   MCV 81.3 86.5   MCH 27.9 26.8*   MCHC 34.4 31.1*   RDW 12.7 12.7   * 263   MPV 8.7 9.0       Recent Labs   Lab 08/29/23 2142 08/31/23  0544    141   K 3.4* 3.7   CO2 21* 23   BUN 10.3 11.6   CREATININE 0.88 0.65   CALCIUM 10.3* 8.2*   MG  --  1.80   ALBUMIN 4.6 3.1*   ALKPHOS 72 51   ALT 17 11   AST 19 12   BILITOT 0.8 0.6          Microbiology Results (last 7 days)       ** No results found for the last 168 hours. **             See below for Radiology    Scheduled Med:   bisacodyL  10 mg Rectal Daily    pantoprazole  40 mg Intravenous Daily        Continuous Infusions:   lactated ringers 125 mL/hr at 08/31/23 0136        PRN Meds:  acetaminophen, acetaminophen, acetaminophen, ketorolac, ketorolac, naloxone, ondansetron, prochlorperazine, sodium chloride 0.9%     _________________________________________________________________________________________________________________________________    Assessment/Plan:  Acute small-bowel obstruction  Acute Crohn's exacerbation  Acute abdominal pain-intractable  Leukocytosis-suspect secondary to Crohn's exacerbation  ______________________________________________________________________________________________________________________________    Patient presents for worsening abdominal pain  nausea and vomiting with history of Crohn's disease.  Imaging reveals Crohn's flare and small-bowel obstruction.    Follow-up on small-bowel [id number] and GI recommendation    NPO for now.     Continue supportive care  Continue checking vital signs q4hrs.     Nurse notified to page me if any changes occur   DVT prophylaxis initiated     Consults:  Gastroenterologist    I have personally reviewed the specialist documentation and/or have spoken to the specialist with regard to the care of this patient; recommendations are noted above.     I have spent >30 minutes on the day of the visit; time spent includes face to face time and non-face to face time preparing to see the patient (eg, review of tests), independently reviewing and interpreting medical records, both past and current; documenting clinical information in the electronic or other health record, and communicating results to the patient/family/caregiver and care coordinator and nursing team.      Anticipated discharge and Disposition when medically stable:      All diagnosis and differential diagnosis have been reviewed,  interpreted and communicated appropriately to care team. assessment and plan has been documented; I have personally reviewed the labs and test results that are presently available and pertinent to this hospital course; I have reviewed medical records based upon their availability.    All of the patient's questions have been  addressed and answered. Patient's is agreeable to the above stated plan.   I will continue to monitor closely and make adjustments to medical management as needed.    Lashanda Cline DO   08/31/2023        This note was created with the assistance of Dragon voice recognition software. There may be transcription errors as a result of using this technology however minimal. Effort has been made to assure accuracy of transcription but any obvious errors or omissions should be clarified with the author of the  document.

## 2023-08-31 NOTE — PROGRESS NOTES
"Gastroenterology Progress Note    Subjective/Interval History:  Patient has done well through the night without nausea, vomiting, abdominal pains.     No bowel movement yet and bowel sounds are hypoactive/very sluggish.     SBFT ordered by surgical team- will await results    NGT has been clamped for about an hour    ROS:  Review of Systems   Constitutional:  Negative for chills and fever.   HENT:  Positive for sore throat. Negative for congestion and hearing loss.    Eyes:  Negative for blurred vision.   Respiratory:  Negative for cough, shortness of breath and wheezing.    Cardiovascular:  Negative for chest pain and palpitations.   Gastrointestinal:  Positive for constipation. Negative for abdominal pain, heartburn, nausea and vomiting.   Genitourinary:  Negative for dysuria.   Skin:  Negative for rash.   Neurological:  Negative for dizziness, weakness and headaches.   Psychiatric/Behavioral: Negative.         Vital Signs:  /62   Pulse (!) 51   Temp 97.8 °F (36.6 °C)   Resp 18   Ht 5' 6" (1.676 m)   Wt 62.9 kg (138 lb 10.7 oz)   LMP 08/30/2023 (Exact Date)   SpO2 100%   Breastfeeding No   BMI 22.38 kg/m²   Body mass index is 22.38 kg/m².    Physical Exam:  Physical Exam  Constitutional:       Appearance: Normal appearance.   HENT:      Head: Normocephalic.      Mouth/Throat:      Mouth: Mucous membranes are dry.   Eyes:      Pupils: Pupils are equal, round, and reactive to light.   Cardiovascular:      Rate and Rhythm: Normal rate and regular rhythm.   Pulmonary:      Effort: Pulmonary effort is normal.      Breath sounds: Normal breath sounds.   Abdominal:      General: Bowel sounds are decreased. There is no distension.      Palpations: Abdomen is soft.      Tenderness: There is no abdominal tenderness. There is no guarding.   Skin:     General: Skin is warm.      Coloration: Skin is not jaundiced.   Neurological:      Mental Status: She is alert and oriented to person, place, and time.      " Motor: No weakness.   Psychiatric:         Mood and Affect: Mood normal.         Behavior: Behavior normal.         Labs:  Recent Results (from the past 48 hour(s))   Comprehensive metabolic panel    Collection Time: 08/29/23  9:42 PM   Result Value Ref Range    Sodium Level 141 136 - 145 mmol/L    Potassium Level 3.4 (L) 3.5 - 5.1 mmol/L    Chloride 106 98 - 107 mmol/L    Carbon Dioxide 21 (L) 22 - 29 mmol/L    Glucose Level 125 (H) 74 - 100 mg/dL    Blood Urea Nitrogen 10.3 7.0 - 18.7 mg/dL    Creatinine 0.88 0.55 - 1.02 mg/dL    Calcium Level Total 10.3 (H) 8.4 - 10.2 mg/dL    Protein Total 7.9 6.4 - 8.3 gm/dL    Albumin Level 4.6 3.5 - 5.0 g/dL    Globulin 3.3 2.4 - 3.5 gm/dL    Albumin/Globulin Ratio 1.4 1.1 - 2.0 ratio    Bilirubin Total 0.8 <=1.5 mg/dL    Alkaline Phosphatase 72 40 - 150 unit/L    Alanine Aminotransferase 17 0 - 55 unit/L    Aspartate Aminotransferase 19 5 - 34 unit/L    eGFR >60 mls/min/1.73/m2   CBC with Differential    Collection Time: 08/29/23  9:42 PM   Result Value Ref Range    WBC 14.80 (H) 4.50 - 11.50 x10(3)/mcL    RBC 5.44 (H) 4.20 - 5.40 x10(6)/mcL    Hgb 15.2 12.0 - 16.0 g/dL    Hct 44.2 37.0 - 47.0 %    MCV 81.3 80.0 - 94.0 fL    MCH 27.9 27.0 - 31.0 pg    MCHC 34.4 33.0 - 36.0 g/dL    RDW 12.7 11.5 - 17.0 %    Platelet 406 (H) 130 - 400 x10(3)/mcL    MPV 8.7 7.4 - 10.4 fL    Neut % 86.6 %    Lymph % 7.5 %    Mono % 5.3 %    Eos % 0.1 %    Basophil % 0.2 %    Lymph # 1.11 0.6 - 4.6 x10(3)/mcL    Neut # 12.82 (H) 2.1 - 9.2 x10(3)/mcL    Mono # 0.79 0.1 - 1.3 x10(3)/mcL    Eos # 0.01 0 - 0.9 x10(3)/mcL    Baso # 0.03 <=0.2 x10(3)/mcL    IG# 0.04 0 - 0.04 x10(3)/mcL    IG% 0.3 %    NRBC% 0.0 %   C-Reactive Protein    Collection Time: 08/29/23  9:42 PM   Result Value Ref Range    C-Reactive Protein 2.60 <5.00 mg/L   Urinalysis, Reflex to Urine Culture    Collection Time: 08/29/23  9:45 PM    Specimen: Urine   Result Value Ref Range    Color, UA Dark Yellow Yellow, Light-Yellow, Dark  Yellow, Monik, Straw    Appearance, UA Clear Clear    Specific Gravity, UA 1.024 1.005 - 1.030    pH, UA 7.5 5.0 - 8.5    Protein, UA Trace (A) Negative    Glucose, UA Negative Negative, Normal    Ketones, UA 4+ (A) Negative    Blood, UA Negative Negative    Bilirubin, UA Negative Negative    Urobilinogen, UA 1.0 0.2, 1.0, Normal    Nitrites, UA Negative Negative    Leukocyte Esterase, UA 1+ (A) Negative   Urinalysis, Microscopic    Collection Time: 08/29/23  9:45 PM   Result Value Ref Range    RBC, UA 0-5 None Seen, 0-2, 3-5, 0-5 /HPF    WBC, UA <5 <=5 /HPF    Squamous Epithelial Cells, UA <5 <=5 /HPF    Bacteria, UA None Seen None Seen, Rare, Occasional /HPF   Pregnancy, urine rapid    Collection Time: 08/29/23  9:45 PM   Result Value Ref Range    Beta hCG Qualitative, Urine Negative Negative   Comprehensive Metabolic Panel (CMP)    Collection Time: 08/31/23  5:44 AM   Result Value Ref Range    Sodium Level 141 136 - 145 mmol/L    Potassium Level 3.7 3.5 - 5.1 mmol/L    Chloride 109 (H) 98 - 107 mmol/L    Carbon Dioxide 23 22 - 29 mmol/L    Glucose Level 65 (L) 74 - 100 mg/dL    Blood Urea Nitrogen 11.6 7.0 - 18.7 mg/dL    Creatinine 0.65 0.55 - 1.02 mg/dL    Calcium Level Total 8.2 (L) 8.4 - 10.2 mg/dL    Protein Total 5.1 (L) 6.4 - 8.3 gm/dL    Albumin Level 3.1 (L) 3.5 - 5.0 g/dL    Globulin 2.0 (L) 2.4 - 3.5 gm/dL    Albumin/Globulin Ratio 1.6 1.1 - 2.0 ratio    Bilirubin Total 0.6 <=1.5 mg/dL    Alkaline Phosphatase 51 40 - 150 unit/L    Alanine Aminotransferase 11 0 - 55 unit/L    Aspartate Aminotransferase 12 5 - 34 unit/L    eGFR >60 mls/min/1.73/m2   Magnesium    Collection Time: 08/31/23  5:44 AM   Result Value Ref Range    Magnesium Level 1.80 1.60 - 2.60 mg/dL   CBC with Differential    Collection Time: 08/31/23  7:14 AM   Result Value Ref Range    WBC 7.79 4.50 - 11.50 x10(3)/mcL    RBC 4.73 4.20 - 5.40 x10(6)/mcL    Hgb 12.7 12.0 - 16.0 g/dL    Hct 40.9 37.0 - 47.0 %    MCV 86.5 80.0 - 94.0 fL    MCH  26.8 (L) 27.0 - 31.0 pg    MCHC 31.1 (L) 33.0 - 36.0 g/dL    RDW 12.7 11.5 - 17.0 %    Platelet 263 130 - 400 x10(3)/mcL    MPV 9.0 7.4 - 10.4 fL    Neut % 75.0 %    Lymph % 18.0 %    Mono % 5.4 %    Eos % 0.8 %    Basophil % 0.3 %    Lymph # 1.40 0.6 - 4.6 x10(3)/mcL    Neut # 5.85 2.1 - 9.2 x10(3)/mcL    Mono # 0.42 0.1 - 1.3 x10(3)/mcL    Eos # 0.06 0 - 0.9 x10(3)/mcL    Baso # 0.02 <=0.2 x10(3)/mcL    IG# 0.04 0 - 0.04 x10(3)/mcL    IG% 0.5 %    NRBC% 0.0 %       Imaging:  CT Abdomen Pelvis With Contrast    Result Date: 8/30/2023  TECHNIQUE:CT OF THE ABDOMEN AND PELVIS WAS PERFORMED WITH AXIAL IMAGES AS WELL AS SAGITTAL AND CORONAL RECONSTRUCTION IMAGES WITH INTRAVENOUS CONTRAST. COMPARISON:COMPARISON IS WITH STUDY DATED 2022-09-15 16:52:14. CLINICAL HISTORY:PT REPORTS SEVERE ABDOMINAL PAIN , VOMITING STARTING TODAY, PT REPORTS APPROX 5-6 EPISODES, PT REPORTS HX OF CHROHNS, LAST BM TODAY, NOT NORMAL BM PER PT. PT IS EXTREMELY ANXIOUS , TACHYPNEIC, DIAPHORETIC. GI SPECIALIST TOLD HER TO COME TO ED AND BE CHECKED FOR OBSTRUCTION. ). DOSAGE INFORMATION:AUTOMATED EXPOSURE CONTROL WAS UTILIZED.   Findings: Thorax: Lungs:The visualized lung bases appear unremarkable. Pleura:No effusions or thickening are seen. Heart:The heart size is within normal limits. Abdomen: Abdominal Wall:No abdominal wall pathology is seen. Liver:The liver appears unremarkable. Biliary System:No intrahepatic or extrahepatic biliary duct dilatation is seen. Gallbladder:Surgical clips are seen in the gallbladder fossa consistent with prior cholecystectomy. Pancreas:The pancreas appears unremarkable. Spleen:The spleen appears unremarkable. Adrenals:The adrenal glands appear unremarkable. Kidneys:The kidneys appear unremarkable with no stones cysts masses or hydronephrosis. Aorta:The abdominal aorta appears unremarkable. Bowel: Esophagus:The visualized esophagus appears unremarkable. Stomach:The stomach appears unremarkable.  Duodenum:Unremarkable appearing duodenum. Small Bowel:There is long segment circumferential wall thickening of the terminal and distal ileum (series 2, image 106). The prior study also demonstrates distal ileal wall thickening. There is long segment moderately distended small bowel loops in the left mid and lower abdomen proximal to the thickened segment. These findings suggest an acute exacerbation of Crohns disease with distal small bowel obstruction. The remainder of the small bowel loops appear unremarkable. Colon:Nondistended. Appendix:No appendix is identified. Peritoneum:No free intraperitoneal air is seen. Minimal free fluid is seen in the pelvis. Pelvis: Bladder:The bladder appears unremarkable. Female: Uterus:The uterus appears unremarkable. Ovaries:No adnexal masses are seen. Bony structures: Dorsal Spine:The visualized dorsal spine appears unremarkable. Bony Pelvis:The visualized bony structures of the pelvis appear unremarkable. Notifications:The results were discussed with the emergency room physician Dr. San prior to dictation at 2023-08-30 01:57:58 CDT.     Impression: 1. There is long segment circumferential wall thickening of the terminal and distal ileum (series 2, image 106). There is long segment moderately distended small bowel loops in the left mid and lower abdomen proximal to the thickened segment. These findings suggest an acute exacerbation of Crohns disease with distal small bowel obstruction. 2. Details and other findings as discussed above. No significant discrepancy with overnight report. Electronically signed by: Watson Gonzalez Date:    08/30/2023 Time:    07:57    XR Gastric tube check, non-radiologist performed    Result Date: 8/30/2023  EXAMINATION: One-view upper abdomen CLINICAL HISTORY: Gastric tube check COMPARISON: CT abdomen and pelvis 08/30/2023 FINDINGS: Enteric tube projects past the GE junction with its tip in side port over the left upper quadrant in the expected  region of the stomach.     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 Likely the culprit. CRP wnl  Entyvio every 4 weeks with next infusion scheduled for 9/11/23  SBO- doing well with clampin trial although bowel sounds are still very hypoactive. Ducolax suppositories have not stimulated a bowel movement yet.  Await SBFT results     Tylenol suppository for pains instead of opioids or nsaids.     Once patient able to resume solid oral intake, Low residue diet only!     No smoking, No NSAIDS       Tess Shi NP acting as scribe for ZACKERY Philip MD

## 2023-08-31 NOTE — PLAN OF CARE
[facility] Acute Care Surgery Plan of Care    Josselyn Romero  [id number]  08/31/2023    SBFT showed contrast in the colon after 2 hours with no sign of obstruction. Order placed to remove NG tube and start low residue diet per GI recs.    Ricardo Garcia MD  [employer] General Surgery PGY-1

## 2023-09-01 VITALS
DIASTOLIC BLOOD PRESSURE: 62 MMHG | BODY MASS INDEX: 22.29 KG/M2 | RESPIRATION RATE: 17 BRPM | TEMPERATURE: 98 F | WEIGHT: 138.69 LBS | HEART RATE: 60 BPM | OXYGEN SATURATION: 100 % | HEIGHT: 66 IN | SYSTOLIC BLOOD PRESSURE: 100 MMHG

## 2023-09-01 PROBLEM — K56.609 SBO (SMALL BOWEL OBSTRUCTION): Status: ACTIVE | Noted: 2023-09-01

## 2023-09-01 PROCEDURE — 63600175 PHARM REV CODE 636 W HCPCS: Performed by: STUDENT IN AN ORGANIZED HEALTH CARE EDUCATION/TRAINING PROGRAM

## 2023-09-01 PROCEDURE — C9113 INJ PANTOPRAZOLE SODIUM, VIA: HCPCS | Performed by: STUDENT IN AN ORGANIZED HEALTH CARE EDUCATION/TRAINING PROGRAM

## 2023-09-01 PROCEDURE — 63600175 PHARM REV CODE 636 W HCPCS: Performed by: NURSE PRACTITIONER

## 2023-09-01 RX ORDER — POLYETHYLENE GLYCOL 3350 17 G/17G
17 POWDER, FOR SOLUTION ORAL 2 TIMES DAILY
Refills: 0
Start: 2023-09-01 | End: 2023-09-08

## 2023-09-01 RX ADMIN — SODIUM CHLORIDE, POTASSIUM CHLORIDE, SODIUM LACTATE AND CALCIUM CHLORIDE: 600; 310; 30; 20 INJECTION, SOLUTION INTRAVENOUS at 10:09

## 2023-09-01 RX ADMIN — PANTOPRAZOLE SODIUM 40 MG: 40 INJECTION, POWDER, FOR SOLUTION INTRAVENOUS at 08:09

## 2023-09-01 NOTE — PROGRESS NOTES
"Gastroenterology Progress Note    Subjective/Interval History:  Patient has done very well. SBFT clear. NGT removed yesterday and she has tolerated 2 low residue diet meals.     She has had 2 loose bowel movements within last 24 hours.     No abdominal pain or tenderness    ROS:  Review of Systems   Constitutional:  Negative for chills and fever.   HENT:  Negative for congestion, hearing loss and sore throat.    Eyes:  Negative for blurred vision.   Respiratory:  Negative for cough, shortness of breath and wheezing.    Cardiovascular:  Negative for chest pain and palpitations.   Gastrointestinal:  Negative for abdominal pain, constipation, heartburn, nausea and vomiting.   Genitourinary:  Negative for dysuria.   Skin:  Negative for rash.   Neurological:  Negative for dizziness, weakness and headaches.   Psychiatric/Behavioral: Negative.         Vital Signs:  /62   Pulse 60   Temp 97.9 °F (36.6 °C) (Oral)   Resp 17   Ht 5' 6" (1.676 m)   Wt 62.9 kg (138 lb 10.7 oz)   LMP 08/30/2023 (Exact Date)   SpO2 100%   Breastfeeding No   BMI 22.38 kg/m²   Body mass index is 22.38 kg/m².    Physical Exam:  Physical Exam  Constitutional:       Appearance: Normal appearance.   HENT:      Head: Normocephalic.      Mouth/Throat:      Mouth: Mucous membranes are dry.   Eyes:      Pupils: Pupils are equal, round, and reactive to light.   Cardiovascular:      Rate and Rhythm: Normal rate and regular rhythm.   Pulmonary:      Effort: Pulmonary effort is normal.      Breath sounds: Normal breath sounds.   Abdominal:      General: Bowel sounds are normal. There is no distension.      Palpations: Abdomen is soft.      Tenderness: There is no abdominal tenderness. There is no guarding.   Skin:     General: Skin is warm.      Coloration: Skin is not jaundiced.   Neurological:      Mental Status: She is alert and oriented to person, place, and time.      Motor: No weakness.   Psychiatric:         Mood and Affect: Mood normal.   "       Behavior: Behavior normal.         Labs:  Recent Results (from the past 48 hour(s))   Comprehensive Metabolic Panel (CMP)    Collection Time: 08/31/23  5:44 AM   Result Value Ref Range    Sodium Level 141 136 - 145 mmol/L    Potassium Level 3.7 3.5 - 5.1 mmol/L    Chloride 109 (H) 98 - 107 mmol/L    Carbon Dioxide 23 22 - 29 mmol/L    Glucose Level 65 (L) 74 - 100 mg/dL    Blood Urea Nitrogen 11.6 7.0 - 18.7 mg/dL    Creatinine 0.65 0.55 - 1.02 mg/dL    Calcium Level Total 8.2 (L) 8.4 - 10.2 mg/dL    Protein Total 5.1 (L) 6.4 - 8.3 gm/dL    Albumin Level 3.1 (L) 3.5 - 5.0 g/dL    Globulin 2.0 (L) 2.4 - 3.5 gm/dL    Albumin/Globulin Ratio 1.6 1.1 - 2.0 ratio    Bilirubin Total 0.6 <=1.5 mg/dL    Alkaline Phosphatase 51 40 - 150 unit/L    Alanine Aminotransferase 11 0 - 55 unit/L    Aspartate Aminotransferase 12 5 - 34 unit/L    eGFR >60 mls/min/1.73/m2   Magnesium    Collection Time: 08/31/23  5:44 AM   Result Value Ref Range    Magnesium Level 1.80 1.60 - 2.60 mg/dL   CBC with Differential    Collection Time: 08/31/23  7:14 AM   Result Value Ref Range    WBC 7.79 4.50 - 11.50 x10(3)/mcL    RBC 4.73 4.20 - 5.40 x10(6)/mcL    Hgb 12.7 12.0 - 16.0 g/dL    Hct 40.9 37.0 - 47.0 %    MCV 86.5 80.0 - 94.0 fL    MCH 26.8 (L) 27.0 - 31.0 pg    MCHC 31.1 (L) 33.0 - 36.0 g/dL    RDW 12.7 11.5 - 17.0 %    Platelet 263 130 - 400 x10(3)/mcL    MPV 9.0 7.4 - 10.4 fL    Neut % 75.0 %    Lymph % 18.0 %    Mono % 5.4 %    Eos % 0.8 %    Basophil % 0.3 %    Lymph # 1.40 0.6 - 4.6 x10(3)/mcL    Neut # 5.85 2.1 - 9.2 x10(3)/mcL    Mono # 0.42 0.1 - 1.3 x10(3)/mcL    Eos # 0.06 0 - 0.9 x10(3)/mcL    Baso # 0.02 <=0.2 x10(3)/mcL    IG# 0.04 0 - 0.04 x10(3)/mcL    IG% 0.5 %    NRBC% 0.0 %       Imaging:  CT Abdomen Pelvis With Contrast    Result Date: 8/30/2023  TECHNIQUE:CT OF THE ABDOMEN AND PELVIS WAS PERFORMED WITH AXIAL IMAGES AS WELL AS SAGITTAL AND CORONAL RECONSTRUCTION IMAGES WITH INTRAVENOUS CONTRAST. COMPARISON:COMPARISON  IS WITH STUDY DATED 2022-09-15 16:52:14. CLINICAL HISTORY:PT REPORTS SEVERE ABDOMINAL PAIN , VOMITING STARTING TODAY, PT REPORTS APPROX 5-6 EPISODES, PT REPORTS HX OF CHROHNS, LAST BM TODAY, NOT NORMAL BM PER PT. PT IS EXTREMELY ANXIOUS , TACHYPNEIC, DIAPHORETIC. GI SPECIALIST TOLD HER TO COME TO ED AND BE CHECKED FOR OBSTRUCTION. ). DOSAGE INFORMATION:AUTOMATED EXPOSURE CONTROL WAS UTILIZED.   Findings: Thorax: Lungs:The visualized lung bases appear unremarkable. Pleura:No effusions or thickening are seen. Heart:The heart size is within normal limits. Abdomen: Abdominal Wall:No abdominal wall pathology is seen. Liver:The liver appears unremarkable. Biliary System:No intrahepatic or extrahepatic biliary duct dilatation is seen. Gallbladder:Surgical clips are seen in the gallbladder fossa consistent with prior cholecystectomy. Pancreas:The pancreas appears unremarkable. Spleen:The spleen appears unremarkable. Adrenals:The adrenal glands appear unremarkable. Kidneys:The kidneys appear unremarkable with no stones cysts masses or hydronephrosis. Aorta:The abdominal aorta appears unremarkable. Bowel: Esophagus:The visualized esophagus appears unremarkable. Stomach:The stomach appears unremarkable. Duodenum:Unremarkable appearing duodenum. Small Bowel:There is long segment circumferential wall thickening of the terminal and distal ileum (series 2, image 106). The prior study also demonstrates distal ileal wall thickening. There is long segment moderately distended small bowel loops in the left mid and lower abdomen proximal to the thickened segment. These findings suggest an acute exacerbation of Crohns disease with distal small bowel obstruction. The remainder of the small bowel loops appear unremarkable. Colon:Nondistended. Appendix:No appendix is identified. Peritoneum:No free intraperitoneal air is seen. Minimal free fluid is seen in the pelvis. Pelvis: Bladder:The bladder appears unremarkable. Female:  Uterus:The uterus appears unremarkable. Ovaries:No adnexal masses are seen. Bony structures: Dorsal Spine:The visualized dorsal spine appears unremarkable. Bony Pelvis:The visualized bony structures of the pelvis appear unremarkable. Notifications:The results were discussed with the emergency room physician Dr. San prior to dictation at 2023-08-30 01:57:58 CDT.     Impression: 1. There is long segment circumferential wall thickening of the terminal and distal ileum (series 2, image 106). There is long segment moderately distended small bowel loops in the left mid and lower abdomen proximal to the thickened segment. These findings suggest an acute exacerbation of Crohns disease with distal small bowel obstruction. 2. Details and other findings as discussed above. No significant discrepancy with overnight report. Electronically signed by: Watson Gonzalez Date:    08/30/2023 Time:    07:57    XR Gastric tube check, non-radiologist performed    Result Date: 8/30/2023  EXAMINATION: One-view upper abdomen CLINICAL HISTORY: Gastric tube check COMPARISON: CT abdomen and pelvis 08/30/2023 FINDINGS: Enteric tube projects past the GE junction with its tip in side port over the left upper quadrant in the expected region of the stomach.     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 Likely the culprit. CRP wnl  Entyvio every 4 weeks with next infusion scheduled for 9/11/23  SBO- resolved    Okay for discharge  Discussed that she should continue low residue diet. Miralax BID x1 week then daily     No smoking, No NSAIDS       Tess Shi NP acting as scribe for ZACKERY Philip MD

## 2023-09-01 NOTE — PROGRESS NOTES
"   Acute Care Surgery   Progress Note  Admit Date: 8/29/2023  HD#2  POD#* No surgery found *    Subjective:   Interval history:  Passed SBFT yesterday, NGT removed, CLD initiated  No issues with diet so far, denies any nausea or vomiting  Denies any abdominal pain  No fevers, chills, CP, SOB    Home Meds:  Current Outpatient Medications   Medication Instructions    BD ALLERGIST TRAY REG BEVEL 1 mL 27 x 1/2" Syrg USE TWO syringes WEEKLY    cetirizine (ZYRTEC) 10 mg, Oral    fluconazole (DIFLUCAN) 150 MG Tab Oral    levocetirizine dihydrochloride (XYZAL ORAL) Oral    pantoprazole (PROTONIX) 40 mg, Oral, Daily    traMADoL (ULTRAM) 50 mg, Oral, Every 6 hours PRN    vedolizumab (ENTYVIO IV)   0 Refill(s)      Scheduled Meds:   bisacodyL  10 mg Rectal Daily    pantoprazole  40 mg Intravenous Daily     Continuous Infusions:   lactated ringers 125 mL/hr at 08/31/23 0136     PRN Meds:acetaminophen, acetaminophen, acetaminophen, ketorolac, ketorolac, naloxone, ondansetron, prochlorperazine, sodium chloride 0.9%     Objective:     VITAL SIGNS: 24 HR MIN & MAX LAST   Temp  Min: 97.7 °F (36.5 °C)  Max: 98.4 °F (36.9 °C)  98.4 °F (36.9 °C)   BP  Min: 95/54  Max: 122/75  101/62    Pulse  Min: 50  Max: 65  (!) 50    Resp  Min: 17  Max: 21  17    SpO2  Min: 95 %  Max: 100 %  100 %      HT: 5' 6" (167.6 cm)  WT: 62.9 kg (138 lb 10.7 oz)  BMI: 22.4     Intake/output:  Intake/Output - Last 3 Shifts         08/30 0700 08/31 0659 08/31 0700 09/01 0659 09/01 0700 09/02 0659    P.O.  360     I.V. (mL/kg) 1500 (23.8)      Total Intake(mL/kg) 1500 (23.8) 360 (5.7)     Urine (mL/kg/hr)  100 (0.1)     Drains  750     Other 400      Stool  0     Total Output 400 850     Net +1100 -490            Urine Occurrence 2 x 8 x     Stool Occurrence  5 x     Emesis Occurrence 400 x              Intake/Output Summary (Last 24 hours) at 9/1/2023 0729  Last data filed at 8/31/2023 2341  Gross per 24 hour   Intake 360 ml   Output 850 ml   Net -490 ml     " "      Lines/drains/airway:       Peripheral IV - Single Lumen 08/30/23 0050 20 G Right Antecubital (Active)   Site Assessment Clean;Dry;Intact;No redness;No swelling 09/01/23 0000   Extremity Assessment Distal to IV No abnormal discoloration;No redness;No swelling;No warmth 08/31/23 0800   Line Status Infusing 09/01/23 0000   Dressing Status Dry;Clean;Intact 09/01/23 0000   Dressing Intervention Integrity maintained 09/01/23 0000   Number of days: 2            NG/OG Tube 08/30/23 0230 Pixley sump 16 Fr. Right nostril (Active)   Placement Check other (see comments) 08/30/23 0800   Distal Tube Length (cm) 61 08/30/23 1822   Tolerance signs/symptoms of discomfort 08/31/23 0800   Securement secured to nostril center w/ adhesive device 08/31/23 0800   Clamp Status/Tolerance unclamped 08/31/23 0800   Suction Setting/Drainage Method low;intermittent setting 08/31/23 0800   Insertion Site Appearance no redness, warmth, tenderness, skin breakdown, drainage 08/31/23 0800   Drainage Bile;Green 08/31/23 0800   Tube Output(mL)(Include Discarded Residual) 750 mL 08/31/23 1626   Number of days: 2       Physical examination:  Gen: NAD, AAOx3, answering questions appropriately  HEENT: EOMI, MMM  CV: RR  Resp: NWOB  Abd: S/NT/ND  Ext: moving all extremities spontaneously and purposefully  Neuro: CN II-XII grossly intact    Labs:  Renal:  Recent Labs     08/29/23 2142 08/31/23  0544   BUN 10.3 11.6   CREATININE 0.88 0.65     No results for input(s): "LACTIC" in the last 72 hours.  FENGI:  Recent Labs     08/29/23 2142 08/31/23  0544    141   K 3.4* 3.7   CO2 21* 23   CALCIUM 10.3* 8.2*   MG  --  1.80   ALBUMIN 4.6 3.1*   BILITOT 0.8 0.6   AST 19 12   ALKPHOS 72 51   ALT 17 11     Heme:  Recent Labs     08/29/23 2142 08/31/23  0714   HGB 15.2 12.7   HCT 44.2 40.9   * 263     ID:  Recent Labs     08/29/23 2142 08/31/23  0714   WBC 14.80* 7.79     CBG:  Recent Labs     08/29/23 2142 08/31/23  0544   GLUCOSE 125* 65*    " "  Cardiovascular:  No results for input(s): "TROPONINI", "CKTOTAL", "CKMB", "BNP" in the last 168 hours.  ABG:  No results for input(s): "PH", "PO2", "PCO2", "HCO3", "BE" in the last 168 hours.   I have reviewed all pertinent lab results within the past 24 hours.    Imaging:  XR Small Bowel Follow Through   Final Result      XR Gastric tube check, non-radiologist performed   Final Result      Satisfactory enteric tube positioning         Electronically signed by: Doni Alcocer MD   Date:    08/30/2023   Time:    07:36      CT Abdomen Pelvis With Contrast   Final Result   Impression:      1. There is long segment circumferential wall thickening of the terminal and distal ileum (series 2, image 106). There is long segment moderately distended small bowel loops in the left mid and lower abdomen proximal to the thickened segment. These findings suggest an acute exacerbation of Crohns disease with distal small bowel obstruction.      2. Details and other findings as discussed above.      No significant discrepancy with overnight report.         Electronically signed by: Watson Gonzalez   Date:    08/30/2023   Time:    07:57         I have reviewed all pertinent imaging results/findings within the past 24 hours.    Micro/Path/Other:  Microbiology Results (last 7 days)       ** No results found for the last 168 hours. **           Specimen (168h ago, onward)      None             Assessment & Plan:   28-year-old female with a history of Crohn's disease who presented 8/29 with abdominal pain, nausea, vomiting, and no flatus for 1 day. CT imaging consistent with segment of ileum Crohn's flare with small bowel obstruction. Passed SBFT, NGT removed 8/31.     -SBO appears to has resolved  -Advance diet as tolerated  -Rest of care per primary  -Acute care surgery will sign off at this time, please contact with any questions or concerns  "

## 2023-09-01 NOTE — DISCHARGE SUMMARY
[facility] Hospital Medicine Discharge Summary    Admit Date: 8/29/2023  Discharge Date and Time: 9/1/20233:22 PM  Admitting Physician: SHARITA Team  Discharging Physician: Lashanda Cline DO.  Primary Care Physician: Susi Christianson DO  Consults: Gastroenterology    Discharge Diagnoses:  Acute small-bowel obstruction  Acute Crohn's exacerbation  Acute abdominal pain-intractable  Leukocytosis-suspect secondary to Crohn's exacerbation    Hospital Course:   28 y.o. female who  PMH includes Crohn's disease; presents to the ED at [facility] on 8/29/2023 with a primary complaint of abdominal pain nausea with vomiting over the past day.  Patient reports she feels as if she is having a Crohn's exacerbation.  She does report eating food-she does not typically eat on a regular basis.  Patient is currently followed outpatient by GI Services and is on Entyvio infusions which she reports has been helping with her exacerbations.  Patient reports her symptoms of abdominal pain with bloating and associated vomiting started over the past day.  Patient reports she called her GI specialist Dr. Murphy who instructed her to come to the ED for further evaluation.  No reports of chest pain, syncope, dizziness, fever, chills cough, congestion, or any sick contacts.  Lab work reviewed demonstrated  WBCs 14.80, potassium 3.4, glucose 125, other indices unremarkable.  CT of the abdomen and pelvis with contrast impression reviewed demonstrated long segment circumferential wall thickening of the terminal and distal ileum, long segment moderately distended small bowel loops in the left mid and lower abdomen proximal to the thickened segment suggest acute exacerbation of Crohn disease with distal small-bowel obstruction.    Initial vital signs reviewed /73 pulse 86 respirations 28 temperature 98.2° F O2 saturation 98% on room air.  Patient did receive IV hydration and pain medication in the ED which her respiratory  rate did trend down to 16.  Surgery and GI Services were consulted.  Patient had NG tube placed to low intermittent wall suction.  Patient is admitted to hospital medicine services for further management.  Noted to have SBO which has resolved; she is tolerating meals.  No plans for endoscopy during hospital course; GI recommend miralax [id number] then daily thereafter.    Hospital course and discharge care plan has been discussed with patient, patient voices understanding and agreement with plan. All questions have been answered to the best of my ability. Patient is advised to return to ED or call 911 in case of emergency and or if symptoms worsen.    Vitals:  VITAL SIGNS: 24 HRS MIN & MAX LAST   Temp  Min: 97.7 °F (36.5 °C)  Max: 98.4 °F (36.9 °C) 97.9 °F (36.6 °C)   BP  Min: 95/54  Max: 109/54 100/62   Pulse  Min: 50  Max: 65  60   Resp  Min: 17  Max: 21 17   SpO2  Min: 98 %  Max: 100 % 100 %       Physical Exam:    General: Appears comfortable, no acute distress.  Integumentary: Warm, dry, intact.  Neuro:awake, alert oriented.     Musculoskeletal: Purposeful movement noted.   Respiratory: No accessory muscle use. Breath sounds are equal.  Cardiovascular: Regular rate. No peripheral edema.    Procedures Performed: No admission procedures for hospital encounter.     Significant Diagnostic Studies: See Full reports for all details    Recent Labs   Lab 08/29/23 2142 08/31/23  0714   WBC 14.80* 7.79   RBC 5.44* 4.73   HGB 15.2 12.7   HCT 44.2 40.9   MCV 81.3 86.5   MCH 27.9 26.8*   MCHC 34.4 31.1*   RDW 12.7 12.7   * 263   MPV 8.7 9.0       Recent Labs   Lab 08/29/23 2142 08/31/23  0544    141   K 3.4* 3.7   CO2 21* 23   BUN 10.3 11.6   CREATININE 0.88 0.65   CALCIUM 10.3* 8.2*   MG  --  1.80   ALBUMIN 4.6 3.1*   ALKPHOS 72 51   ALT 17 11   AST 19 12   BILITOT 0.8 0.6        Microbiology Results (last 7 days)       ** No results found for the last 168 hours. **             XR Small Bowel Follow  "Through  EXAMINATION:  XR SMALL BOWEL FOLLOW THROUGH    CLINICAL HISTORY:  Small bowel obstruction;    TECHNIQUE:  Small bowel series was performed with plain radiographs following administration of water-soluble contrast.  No fluoroscopy utilized.    COMPARISON:  None    FINDINGS:  Enteric tube terminates in the stomach.    Transit time to the colon is less than 2 hours.  Small bowel loops are normal in caliber.  No evidence of obstruction.    Electronically signed by: Rosemary Good  Date:    08/31/2023  Time:    12:58         Medication List        START taking these medications      polyethylene glycol 17 gram Pwpk  Commonly known as: GLYCOLAX  Take 17 g by mouth 2 (two) times daily. for 7 days            CONTINUE taking these medications      BD Allergist Tray Reg BeveL 1 mL 27 x 1/2" Syrg  Generic drug: syringe with needle     cetirizine 10 MG tablet  Commonly known as: ZYRTEC     ENTYVIO IV     fluconazole 150 MG Tab  Commonly known as: DIFLUCAN     pantoprazole 40 MG tablet  Commonly known as: PROTONIX     XYZAL ORAL            STOP taking these medications      traMADoL 50 mg tablet  Commonly known as: ULTRAM               Where to Get Your Medications        Information about where to get these medications is not yet available    Ask your nurse or doctor about these medications  polyethylene glycol 17 gram Pwpk          Explained in detail to the patient about the discharge plan, medications, and follow-up visits. Pt understands and agrees with the treatment plan  Discharge Disposition: Home or Self Care   Discharged Condition: stable  Diet-   Dietary Orders (From admission, onward)       Start     Ordered    08/31/23 1604  Diet low fiber/residue  Diet effective now         08/31/23 1604                   Medications Per DC med rec  Activities as tolerated   Follow-up Information       Kai Murphy MD Follow up in 1 week(s).    Specialty: Gastroenterology  Contact information:  [address] [facility] [address]  [facility] [address]  [phone]                           For further questions contact hospitalist office  Discharge time 33 minutes  For worsening symptoms, chest pain, shortness of breath, increased abdominal pain, high grade fever, stroke or stroke like symptoms, immediately go to the nearest Emergency Room or call 911 as soon as possible.      Lashanda Toledo M.D, on 9/1/2023. at 3:22 PM.

## 2023-12-27 ENCOUNTER — OFFICE VISIT (OUTPATIENT)
Dept: URGENT CARE | Facility: CLINIC | Age: 28
End: 2023-12-27
Payer: COMMERCIAL

## 2023-12-27 VITALS
HEIGHT: 66 IN | WEIGHT: 145 LBS | HEART RATE: 93 BPM | TEMPERATURE: 100 F | SYSTOLIC BLOOD PRESSURE: 108 MMHG | RESPIRATION RATE: 16 BRPM | DIASTOLIC BLOOD PRESSURE: 71 MMHG | BODY MASS INDEX: 23.3 KG/M2 | OXYGEN SATURATION: 100 %

## 2023-12-27 DIAGNOSIS — J10.1 INFLUENZA A: Primary | ICD-10-CM

## 2023-12-27 DIAGNOSIS — J02.9 SORE THROAT: ICD-10-CM

## 2023-12-27 LAB
CTP QC/QA: YES
MOLECULAR STREP A: NEGATIVE
POC MOLECULAR INFLUENZA A AGN: POSITIVE
POC MOLECULAR INFLUENZA B AGN: NEGATIVE
SARS-COV-2 RDRP RESP QL NAA+PROBE: NEGATIVE

## 2023-12-27 PROCEDURE — 87635: ICD-10-PCS | Mod: QW,,,

## 2023-12-27 PROCEDURE — 87651 POCT STREP A MOLECULAR: ICD-10-PCS | Mod: QW,,,

## 2023-12-27 PROCEDURE — 87502 INFLUENZA DNA AMP PROBE: CPT | Mod: QW,,,

## 2023-12-27 PROCEDURE — 87651 STREP A DNA AMP PROBE: CPT | Mod: QW,,,

## 2023-12-27 PROCEDURE — 99214 OFFICE O/P EST MOD 30 MIN: CPT | Mod: ,,,

## 2023-12-27 PROCEDURE — 87635 SARS-COV-2 COVID-19 AMP PRB: CPT | Mod: QW,,,

## 2023-12-27 PROCEDURE — 87502 POCT INFLUENZA A/B MOLECULAR: ICD-10-PCS | Mod: QW,,,

## 2023-12-27 PROCEDURE — 99214 PR OFFICE/OUTPT VISIT, EST, LEVL IV, 30-39 MIN: ICD-10-PCS | Mod: ,,,

## 2023-12-27 RX ORDER — BALOXAVIR MARBOXIL 40 MG/1
40 TABLET, FILM COATED ORAL ONCE
Qty: 1 TABLET | Refills: 0 | Status: SHIPPED | OUTPATIENT
Start: 2023-12-27 | End: 2023-12-27

## 2023-12-27 NOTE — PROGRESS NOTES
"Subjective:      Patient ID: Josselyn Romero is a 28 y.o. female.    Vitals:  height is 5' 6" (1.676 m) and weight is 65.8 kg (145 lb). Her oral temperature is 99.6 °F (37.6 °C). Her blood pressure is 108/71 and her pulse is 93. Her respiration is 16 and oxygen saturation is 100%.     Chief Complaint: Sore Throat (Pt c/o sore throat, fever (101), nasal congestion, fatigue, cough (productive) body aches & chills, HA. Symptoms x2 days. Has taken children's tylenol/dimetapp & Oscillococcinum - mild relief )    Patient is a 28-year-old female that presents complaining of flu-like symptoms of sore throat, cough, body aches, fatigue, chills, and headache x2 days. Patient denies any SOB, CP, rash, n/v/d, or neck stiffness.          Constitution: Positive for chills and fatigue.   HENT:  Positive for sore throat.    Respiratory:  Positive for cough.    Neurological:  Positive for headaches.      Objective:     Physical Exam   Constitutional: She is oriented to person, place, and time.  Non-toxic appearance. She does not appear ill.   HENT:   Ears:   Right Ear: Tympanic membrane, external ear and ear canal normal.   Left Ear: Tympanic membrane, external ear and ear canal normal.   Nose: Congestion present.      Comments: Clear pnd noted  Mouth/Throat: Posterior oropharyngeal erythema present.   Eyes: Conjunctivae are normal.   Cardiovascular: Normal rate and normal pulses.   Pulmonary/Chest: Effort normal and breath sounds normal.   Abdominal: Normal appearance and bowel sounds are normal. Soft. There is no abdominal tenderness.   Musculoskeletal: Normal range of motion.         General: Normal range of motion.   Neurological: She is alert and oriented to person, place, and time.   Skin: Skin is warm, dry and no rash. Capillary refill takes less than 2 seconds.   Psychiatric: Her behavior is normal. Mood normal.       Assessment:     1. Influenza A    2. Sore throat           Previous History      Review of patient's allergies " "indicates:   Allergen Reactions    Budesonide      Other reaction(s): Stomach ache    Codeine      Other reaction(s): unknown    Azathioprine Nausea And Vomiting       Past Medical History:   Diagnosis Date    Crohn's colitis      Current Outpatient Medications   Medication Instructions    BD ALLERGIST TRAY REG BEVEL 1 mL 27 x 1/2" Syrg USE TWO syringes WEEKLY    cetirizine (ZYRTEC) 10 mg, Oral    fluconazole (DIFLUCAN) 150 MG Tab Oral    levocetirizine dihydrochloride (XYZAL ORAL) Oral    pantoprazole (PROTONIX) 40 mg, Oral, Daily    vedolizumab (ENTYVIO IV)   0 Refill(s)    XOFLUZA 40 mg, Oral, Once     Past Surgical History:   Procedure Laterality Date    CHOLECYSTECTOMY      TONSILLECTOMY       Family History   Problem Relation Age of Onset    Scoliosis Mother     Heart disease Mother     Scoliosis Brother     Breast cancer Maternal Grandmother     Diabetes Paternal Grandmother        Social History     Tobacco Use    Smoking status: Former     Types: Vaping with nicotine    Smokeless tobacco: Never   Substance Use Topics    Alcohol use: Not Currently    Drug use: Yes     Types: Marijuana     Comment: medical marijuana        Physical Exam      Vital Signs Reviewed   /71   Pulse 93   Temp 99.6 °F (37.6 °C) (Oral)   Resp 16   Ht 5' 6" (1.676 m)   Wt 65.8 kg (145 lb)   LMP 12/24/2023 (Exact Date)   SpO2 100%   BMI 23.40 kg/m²        Procedures    Procedures     Labs     Results for orders placed or performed in visit on 12/27/23   POCT COVID-19 Rapid Screening   Result Value Ref Range    POC Rapid COVID Negative Negative     Acceptable Yes    POCT Influenza A/B Molecular   Result Value Ref Range    POC Molecular Influenza A Ag Positive (A) Negative, Not Reported    POC Molecular Influenza B Ag Negative Negative, Not Reported     Acceptable Yes    POCT Strep A, Molecular   Result Value Ref Range    Molecular Strep A, POC Negative Negative     Acceptable Yes "       Plan:       Influenza A  -     baloxavir marboxiL (XOFLUZA) 40 mg tablet; Take 1 tablet (40 mg total) by mouth once. for 1 dose  Dispense: 1 tablet; Refill: 0    Sore throat  -     POCT COVID-19 Rapid Screening  -     POCT Influenza A/B Molecular  -     POCT Strep A, Molecular      Drink plenty of fluids.     Tylenol every 4 hours, Motrin every 6 as needed for fever.    Take OTC cough/cold/congestion medication such as Dayquil/Nyquil.  May also take antihistamine such as Claritin/Zyrtec/Allegra.  Cepacol sore throat lozenges if needed.     Patient is contagious for 5 days from symptom onset.     Go to ER with any shortness of breath or other worrisome symptoms.

## 2023-12-28 ENCOUNTER — TELEPHONE (OUTPATIENT)
Dept: URGENT CARE | Facility: CLINIC | Age: 28
End: 2023-12-28
Payer: COMMERCIAL

## 2023-12-28 RX ORDER — PROMETHAZINE HYDROCHLORIDE AND DEXTROMETHORPHAN HYDROBROMIDE 6.25; 15 MG/5ML; MG/5ML
5 SYRUP ORAL EVERY 8 HOURS PRN
Qty: 118 ML | Refills: 0 | Status: SHIPPED | OUTPATIENT
Start: 2023-12-28 | End: 2024-01-02

## 2023-12-28 RX ORDER — OSELTAMIVIR PHOSPHATE 75 MG/1
75 CAPSULE ORAL 2 TIMES DAILY
Qty: 10 CAPSULE | Refills: 0 | Status: SHIPPED | OUTPATIENT
Start: 2023-12-28 | End: 2024-01-02

## 2023-12-28 NOTE — TELEPHONE ENCOUNTER
Pt just called asking for tamiflu and then called back asking if she could receive a medication for her cough as well.

## 2024-02-19 PROBLEM — K50.80 CROHN'S DISEASE OF BOTH SMALL AND LARGE INTESTINE WITHOUT COMPLICATION: Status: ACTIVE | Noted: 2024-02-19

## 2024-08-20 ENCOUNTER — OFFICE VISIT (OUTPATIENT)
Dept: URGENT CARE | Facility: CLINIC | Age: 29
End: 2024-08-20
Payer: COMMERCIAL

## 2024-08-20 VITALS
OXYGEN SATURATION: 100 % | BODY MASS INDEX: 22.18 KG/M2 | DIASTOLIC BLOOD PRESSURE: 63 MMHG | TEMPERATURE: 98 F | HEIGHT: 66 IN | WEIGHT: 138 LBS | SYSTOLIC BLOOD PRESSURE: 107 MMHG | RESPIRATION RATE: 20 BRPM | HEART RATE: 84 BPM

## 2024-08-20 DIAGNOSIS — R09.81 NASAL CONGESTION: ICD-10-CM

## 2024-08-20 DIAGNOSIS — R05.1 ACUTE COUGH: Primary | ICD-10-CM

## 2024-08-20 LAB — MYCOPLAS PCR (OHS): NEGATIVE

## 2024-08-20 PROCEDURE — 87581 M.PNEUMON DNA AMP PROBE: CPT | Performed by: FAMILY MEDICINE

## 2024-08-20 PROCEDURE — 99213 OFFICE O/P EST LOW 20 MIN: CPT | Mod: ,,, | Performed by: FAMILY MEDICINE

## 2024-08-20 RX ORDER — PREDNISONE 10 MG/1
30 TABLET ORAL DAILY
Qty: 15 TABLET | Refills: 0 | Status: SHIPPED | OUTPATIENT
Start: 2024-08-20 | End: 2024-08-25

## 2024-08-20 RX ORDER — ALBUTEROL SULFATE 0.83 MG/ML
2.5 SOLUTION RESPIRATORY (INHALATION) EVERY 6 HOURS PRN
Qty: 90 ML | Refills: 0 | Status: SHIPPED | OUTPATIENT
Start: 2024-08-20 | End: 2025-08-20

## 2024-08-20 RX ORDER — CHLOPHEDIANOL HCL AND PYRILAMINE MALEATE 12.5; 12.5 MG/5ML; MG/5ML
10 SOLUTION ORAL
Qty: 200 ML | Refills: 0 | Status: SHIPPED | OUTPATIENT
Start: 2024-08-20

## 2024-08-20 NOTE — PATIENT INSTRUCTIONS
Medications sent to pharmacy.   We will call with results of mycoplasma test when available  Increase fluids intake to prevent dehydration. You may take Tylenol and ibuprofen as directed if needed. Get plenty of rest. May use saline nose spray and humidifer at bedtime. Warm saltwater gargles for sore throat. Warm water with honey to help coat the throat. Throat lozenges. Chloraseptic spray for worsening sore throat. Do not smoke or allow others to smoke around you. Practice good hand hygiene to include frequent hand washing to lessen the likelihood of transmission. Return or seek immediate medical attention for any new or worsening symptoms such as trouble breathing, continued high fever, neck stiffness, rash, or if you do not get better as expected.

## 2024-08-20 NOTE — PROGRESS NOTES
"Subjective:      Patient ID: Josselyn Romero is a 29 y.o. female.    Vitals:  height is 5' 6" (1.676 m) and weight is 62.6 kg (138 lb). Her oral temperature is 97.9 °F (36.6 °C). Her blood pressure is 107/63 and her pulse is 84. Her respiration is 20 and oxygen saturation is 100%.     Chief Complaint: Cough     Patient is a 29 y.o. female who presents to urgent care with complaints of cough, chest congestion, bilat itchy, watery eyes x 1 weeks. Alleviating factors include cortisone injection (8/13), robitussin, ryann pot,  xyzal, azelastine nasal spray and eye drips with mild amount of relief. Patient denies sob, sneezing, sore throat, fever.  She has a close exposure to two children with mycoplasma.  She has a prescription for azithromycin, but has not taken this medication.  History of Crohn's reports antibiotics may upset her stomach.         Constitution: Negative for chills, sweating, fatigue and fever.   HENT:  Positive for congestion and postnasal drip. Negative for ear pain, ear discharge, sinus pain, sinus pressure and sore throat.    Neck: neck negative.   Cardiovascular: Negative.    Eyes: Negative.    Respiratory:  Positive for cough and sputum production. Negative for chest tightness, bloody sputum, shortness of breath, stridor and wheezing.    Gastrointestinal: Negative.    Endocrine: negative.   Genitourinary: Negative.    Musculoskeletal: Negative.    Skin: Negative.    Allergic/Immunologic: Positive for environmental allergies and sneezing.   Neurological: Negative.  Negative for disorientation and altered mental status.   Hematologic/Lymphatic: Negative.    Psychiatric/Behavioral:  Negative for altered mental status, disorientation and confusion.       Objective:     Physical Exam   Constitutional: She is oriented to person, place, and time. She appears well-developed. She is cooperative.  Non-toxic appearance. She does not appear ill. No distress.   HENT:   Head: Normocephalic and atraumatic.   Ears: "   Right Ear: Hearing, tympanic membrane, external ear and ear canal normal.   Left Ear: Hearing, tympanic membrane, external ear and ear canal normal.   Nose: Congestion present. No mucosal edema, rhinorrhea or nasal deformity. No epistaxis. Right sinus exhibits no maxillary sinus tenderness and no frontal sinus tenderness. Left sinus exhibits no maxillary sinus tenderness and no frontal sinus tenderness.   Mouth/Throat: Uvula is midline, oropharynx is clear and moist and mucous membranes are normal. Mucous membranes are moist. No trismus in the jaw. Normal dentition. No uvula swelling. No oropharyngeal exudate, posterior oropharyngeal edema or posterior oropharyngeal erythema. Oropharynx is clear.      Comments: Thick yellow postnasal drip  Eyes: Conjunctivae and lids are normal. No scleral icterus.   Neck: Trachea normal and phonation normal. Neck supple. No edema present. No erythema present. No neck rigidity present.   Cardiovascular: Normal rate, regular rhythm, normal heart sounds and normal pulses.   Pulmonary/Chest: Effort normal and breath sounds normal. No respiratory distress. She has no decreased breath sounds. She has no rhonchi.   Abdominal: Normal appearance.   Musculoskeletal: Normal range of motion.         General: No deformity. Normal range of motion.   Neurological: She is alert and oriented to person, place, and time. She exhibits normal muscle tone. Coordination normal.   Skin: Skin is warm, dry, intact, not diaphoretic and not pale.   Psychiatric: Her speech is normal and behavior is normal. Judgment and thought content normal.   Nursing note and vitals reviewed.         Previous History      Review of patient's allergies indicates:   Allergen Reactions    Budesonide      Other reaction(s): Stomach ache    Codeine      Other reaction(s): unknown    Azathioprine Nausea And Vomiting       Past Medical History:   Diagnosis Date    Crohn's colitis      Current Outpatient Medications   Medication  "Instructions    BD ALLERGIST TRAY REG BEVEL 1 mL 27 x 1/2" Syrg USE TWO syringes WEEKLY    levocetirizine dihydrochloride (XYZAL ORAL) Oral    pantoprazole (PROTONIX) 40 mg, Oral, Daily    predniSONE (DELTASONE) 30 mg, Oral, Daily    pyrilamine-chlophedianoL (NINJACOF) 12.5-12.5 mg/5 mL Liqd 10 mLs, Oral, Every 6-8 hours PRN    vedolizumab (ENTYVIO IV)   0 Refill(s)     Past Surgical History:   Procedure Laterality Date    CHOLECYSTECTOMY      TONSILLECTOMY       Family History   Problem Relation Name Age of Onset    Scoliosis Mother      Heart disease Mother      No Known Problems Father      No Known Problems Sister      Scoliosis Brother      Breast cancer Maternal Grandmother      Diabetes Paternal Grandmother         Social History     Tobacco Use    Smoking status: Former     Types: Vaping with nicotine    Smokeless tobacco: Never   Substance Use Topics    Alcohol use: Not Currently    Drug use: Yes     Types: Marijuana     Comment: medical marijuana        Physical Exam      Vital Signs Reviewed   /63 (BP Location: Right arm)   Pulse 84   Temp 97.9 °F (36.6 °C) (Oral)   Resp 20   Ht 5' 6" (1.676 m)   Wt 62.6 kg (138 lb)   LMP 08/13/2024 (Approximate)   SpO2 100%   BMI 22.27 kg/m²        Procedures    Procedures     Labs     Results for orders placed or performed in visit on 07/01/24    PAP SMEAR   Result Value Ref Range    PAP Recommendation External Pap in 12 months       Assessment:     1. Acute cough    2. Nasal congestion        Plan:   Medications sent to pharmacy.   We will call with results of mycoplasma test when available.  If positive you will be instructed to take the prescription of azithromycin that you already have.  Increase fluids intake to prevent dehydration. You may take Tylenol and ibuprofen as directed if needed. Get plenty of rest. May use saline nose spray and humidifer at bedtime. Warm saltwater gargles for sore throat. Warm water with honey to help coat the throat. " Throat lozenges. Chloraseptic spray for worsening sore throat. Do not smoke or allow others to smoke around you. Practice good hand hygiene to include frequent hand washing to lessen the likelihood of transmission. Return or seek immediate medical attention for any new or worsening symptoms such as trouble breathing, continued high fever, neck stiffness, rash, or if you do not get better as expected.      Acute cough  -     MYCOPLASMA BY PCR; Future; Expected date: 08/20/2024    Nasal congestion    Other orders  -     predniSONE (DELTASONE) 10 MG tablet; Take 3 tablets (30 mg total) by mouth once daily. for 5 days  Dispense: 15 tablet; Refill: 0  -     pyrilamine-chlophedianoL (NINJACOF) 12.5-12.5 mg/5 mL Liqd; Take 10 mLs by mouth every 6 to 8 hours as needed (cough).  Dispense: 200 mL; Refill: 0